# Patient Record
Sex: FEMALE | Race: WHITE | NOT HISPANIC OR LATINO
[De-identification: names, ages, dates, MRNs, and addresses within clinical notes are randomized per-mention and may not be internally consistent; named-entity substitution may affect disease eponyms.]

---

## 2017-01-09 ENCOUNTER — APPOINTMENT (OUTPATIENT)
Dept: HEMATOLOGY ONCOLOGY | Facility: CLINIC | Age: 67
End: 2017-01-09

## 2017-01-09 VITALS
SYSTOLIC BLOOD PRESSURE: 159 MMHG | WEIGHT: 150 LBS | DIASTOLIC BLOOD PRESSURE: 81 MMHG | BODY MASS INDEX: 24.11 KG/M2 | RESPIRATION RATE: 14 BRPM | TEMPERATURE: 96.8 F | HEIGHT: 66 IN

## 2017-01-09 DIAGNOSIS — N62 HYPERTROPHY OF BREAST: ICD-10-CM

## 2017-01-25 ENCOUNTER — RX RENEWAL (OUTPATIENT)
Age: 67
End: 2017-01-25

## 2017-04-19 ENCOUNTER — APPOINTMENT (OUTPATIENT)
Dept: CARDIOLOGY | Facility: CLINIC | Age: 67
End: 2017-04-19

## 2017-04-19 VITALS
WEIGHT: 150 LBS | SYSTOLIC BLOOD PRESSURE: 138 MMHG | HEIGHT: 66 IN | BODY MASS INDEX: 24.11 KG/M2 | DIASTOLIC BLOOD PRESSURE: 80 MMHG | HEART RATE: 59 BPM

## 2017-04-24 ENCOUNTER — APPOINTMENT (OUTPATIENT)
Dept: HEMATOLOGY ONCOLOGY | Facility: CLINIC | Age: 67
End: 2017-04-24

## 2017-04-24 ENCOUNTER — OUTPATIENT (OUTPATIENT)
Dept: OUTPATIENT SERVICES | Facility: HOSPITAL | Age: 67
LOS: 1 days | Discharge: HOME | End: 2017-04-24

## 2017-04-24 VITALS
HEIGHT: 66 IN | BODY MASS INDEX: 24.27 KG/M2 | RESPIRATION RATE: 14 BRPM | WEIGHT: 151 LBS | HEART RATE: 66 BPM | DIASTOLIC BLOOD PRESSURE: 72 MMHG | TEMPERATURE: 97.1 F | SYSTOLIC BLOOD PRESSURE: 177 MMHG

## 2017-04-24 RX ORDER — MULTIVITAMIN
TABLET ORAL DAILY
Refills: 0 | Status: ACTIVE | COMMUNITY

## 2017-05-15 ENCOUNTER — APPOINTMENT (OUTPATIENT)
Dept: BREAST CENTER | Facility: CLINIC | Age: 67
End: 2017-05-15

## 2017-05-15 VITALS
WEIGHT: 150 LBS | DIASTOLIC BLOOD PRESSURE: 92 MMHG | SYSTOLIC BLOOD PRESSURE: 158 MMHG | BODY MASS INDEX: 29.45 KG/M2 | HEIGHT: 60 IN

## 2017-06-28 DIAGNOSIS — D05.12 INTRADUCTAL CARCINOMA IN SITU OF LEFT BREAST: ICD-10-CM

## 2017-08-16 ENCOUNTER — OUTPATIENT (OUTPATIENT)
Dept: OUTPATIENT SERVICES | Facility: HOSPITAL | Age: 67
LOS: 1 days | Discharge: HOME | End: 2017-08-16

## 2017-08-16 DIAGNOSIS — R92.8 OTHER ABNORMAL AND INCONCLUSIVE FINDINGS ON DIAGNOSTIC IMAGING OF BREAST: ICD-10-CM

## 2017-08-23 ENCOUNTER — OUTPATIENT (OUTPATIENT)
Dept: OUTPATIENT SERVICES | Facility: HOSPITAL | Age: 67
LOS: 1 days | Discharge: HOME | End: 2017-08-23

## 2017-08-23 DIAGNOSIS — D05.11 INTRADUCTAL CARCINOMA IN SITU OF RIGHT BREAST: ICD-10-CM

## 2017-08-30 ENCOUNTER — APPOINTMENT (OUTPATIENT)
Dept: BREAST CENTER | Facility: CLINIC | Age: 67
End: 2017-08-30
Payer: COMMERCIAL

## 2017-08-30 VITALS
SYSTOLIC BLOOD PRESSURE: 136 MMHG | BODY MASS INDEX: 29.45 KG/M2 | DIASTOLIC BLOOD PRESSURE: 82 MMHG | HEIGHT: 60 IN | WEIGHT: 150 LBS

## 2017-08-30 PROCEDURE — 99213 OFFICE O/P EST LOW 20 MIN: CPT

## 2017-09-13 ENCOUNTER — APPOINTMENT (OUTPATIENT)
Dept: CARDIOLOGY | Facility: CLINIC | Age: 67
End: 2017-09-13

## 2017-09-13 VITALS
DIASTOLIC BLOOD PRESSURE: 80 MMHG | WEIGHT: 151 LBS | SYSTOLIC BLOOD PRESSURE: 122 MMHG | BODY MASS INDEX: 29.64 KG/M2 | HEIGHT: 60 IN | HEART RATE: 61 BPM

## 2017-09-13 RX ORDER — AZELAIC ACID 0.15 G/G
15 GEL TOPICAL TWICE DAILY
Refills: 0 | Status: ACTIVE | COMMUNITY

## 2017-11-20 ENCOUNTER — APPOINTMENT (OUTPATIENT)
Dept: HEMATOLOGY ONCOLOGY | Facility: CLINIC | Age: 67
End: 2017-11-20

## 2017-11-20 ENCOUNTER — OUTPATIENT (OUTPATIENT)
Dept: OUTPATIENT SERVICES | Facility: HOSPITAL | Age: 67
LOS: 1 days | Discharge: HOME | End: 2017-11-20

## 2017-11-20 VITALS
RESPIRATION RATE: 14 BRPM | HEART RATE: 70 BPM | DIASTOLIC BLOOD PRESSURE: 92 MMHG | HEIGHT: 60 IN | BODY MASS INDEX: 30.43 KG/M2 | TEMPERATURE: 96.7 F | SYSTOLIC BLOOD PRESSURE: 142 MMHG | WEIGHT: 155 LBS

## 2017-11-22 DIAGNOSIS — D05.12 INTRADUCTAL CARCINOMA IN SITU OF LEFT BREAST: ICD-10-CM

## 2018-01-11 ENCOUNTER — APPOINTMENT (OUTPATIENT)
Dept: CARDIOLOGY | Facility: CLINIC | Age: 68
End: 2018-01-11

## 2018-01-11 VITALS
BODY MASS INDEX: 30.23 KG/M2 | HEART RATE: 81 BPM | HEIGHT: 60 IN | DIASTOLIC BLOOD PRESSURE: 80 MMHG | WEIGHT: 154 LBS | SYSTOLIC BLOOD PRESSURE: 148 MMHG

## 2018-02-16 ENCOUNTER — OUTPATIENT (OUTPATIENT)
Dept: OUTPATIENT SERVICES | Facility: HOSPITAL | Age: 68
LOS: 1 days | Discharge: HOME | End: 2018-02-16

## 2018-02-16 ENCOUNTER — LABORATORY RESULT (OUTPATIENT)
Age: 68
End: 2018-02-16

## 2018-02-16 DIAGNOSIS — Z85.3 PERSONAL HISTORY OF MALIGNANT NEOPLASM OF BREAST: ICD-10-CM

## 2018-02-16 DIAGNOSIS — R92.8 OTHER ABNORMAL AND INCONCLUSIVE FINDINGS ON DIAGNOSTIC IMAGING OF BREAST: ICD-10-CM

## 2018-02-17 DIAGNOSIS — D05.11 INTRADUCTAL CARCINOMA IN SITU OF RIGHT BREAST: ICD-10-CM

## 2018-02-21 ENCOUNTER — OUTPATIENT (OUTPATIENT)
Dept: OUTPATIENT SERVICES | Facility: HOSPITAL | Age: 68
LOS: 1 days | Discharge: HOME | End: 2018-02-21

## 2018-02-21 DIAGNOSIS — D05.11 INTRADUCTAL CARCINOMA IN SITU OF RIGHT BREAST: ICD-10-CM

## 2018-03-07 ENCOUNTER — OUTPATIENT (OUTPATIENT)
Dept: OUTPATIENT SERVICES | Facility: HOSPITAL | Age: 68
LOS: 1 days | Discharge: HOME | End: 2018-03-07

## 2018-03-07 ENCOUNTER — RESULT REVIEW (OUTPATIENT)
Age: 68
End: 2018-03-07

## 2018-03-07 DIAGNOSIS — R92.8 OTHER ABNORMAL AND INCONCLUSIVE FINDINGS ON DIAGNOSTIC IMAGING OF BREAST: ICD-10-CM

## 2018-03-07 DIAGNOSIS — D05.11 INTRADUCTAL CARCINOMA IN SITU OF RIGHT BREAST: ICD-10-CM

## 2018-03-08 LAB — SURGICAL PATHOLOGY STUDY: SIGNIFICANT CHANGE UP

## 2018-03-20 ENCOUNTER — APPOINTMENT (OUTPATIENT)
Dept: BREAST CENTER | Facility: CLINIC | Age: 68
End: 2018-03-20
Payer: COMMERCIAL

## 2018-03-20 VITALS
HEIGHT: 66 IN | HEART RATE: 81 BPM | WEIGHT: 154 LBS | BODY MASS INDEX: 24.75 KG/M2 | SYSTOLIC BLOOD PRESSURE: 124 MMHG | DIASTOLIC BLOOD PRESSURE: 80 MMHG

## 2018-03-20 VITALS — OXYGEN SATURATION: 98 %

## 2018-03-20 PROCEDURE — 99213 OFFICE O/P EST LOW 20 MIN: CPT

## 2018-05-21 ENCOUNTER — APPOINTMENT (OUTPATIENT)
Dept: HEMATOLOGY ONCOLOGY | Facility: CLINIC | Age: 68
End: 2018-05-21

## 2018-05-21 ENCOUNTER — OUTPATIENT (OUTPATIENT)
Dept: OUTPATIENT SERVICES | Facility: HOSPITAL | Age: 68
LOS: 1 days | Discharge: HOME | End: 2018-05-21

## 2018-05-21 VITALS
HEIGHT: 66 IN | SYSTOLIC BLOOD PRESSURE: 185 MMHG | RESPIRATION RATE: 16 BRPM | BODY MASS INDEX: 25.23 KG/M2 | HEART RATE: 61 BPM | WEIGHT: 157 LBS | DIASTOLIC BLOOD PRESSURE: 85 MMHG | TEMPERATURE: 96.7 F

## 2018-05-23 DIAGNOSIS — D05.12 INTRADUCTAL CARCINOMA IN SITU OF LEFT BREAST: ICD-10-CM

## 2018-06-13 ENCOUNTER — APPOINTMENT (OUTPATIENT)
Dept: CARDIOLOGY | Facility: CLINIC | Age: 68
End: 2018-06-13

## 2018-06-13 VITALS
SYSTOLIC BLOOD PRESSURE: 124 MMHG | DIASTOLIC BLOOD PRESSURE: 78 MMHG | WEIGHT: 155 LBS | BODY MASS INDEX: 25.02 KG/M2 | HEART RATE: 63 BPM

## 2018-07-20 ENCOUNTER — APPOINTMENT (OUTPATIENT)
Dept: CARDIOLOGY | Facility: CLINIC | Age: 68
End: 2018-07-20

## 2018-07-31 ENCOUNTER — FORM ENCOUNTER (OUTPATIENT)
Age: 68
End: 2018-07-31

## 2018-08-01 ENCOUNTER — OUTPATIENT (OUTPATIENT)
Dept: OUTPATIENT SERVICES | Facility: HOSPITAL | Age: 68
LOS: 1 days | Discharge: HOME | End: 2018-08-01

## 2018-08-01 DIAGNOSIS — R92.8 OTHER ABNORMAL AND INCONCLUSIVE FINDINGS ON DIAGNOSTIC IMAGING OF BREAST: ICD-10-CM

## 2018-08-09 ENCOUNTER — APPOINTMENT (OUTPATIENT)
Dept: BREAST CENTER | Facility: CLINIC | Age: 68
End: 2018-08-09
Payer: COMMERCIAL

## 2018-08-09 VITALS
HEIGHT: 66 IN | HEART RATE: 73 BPM | OXYGEN SATURATION: 97 % | BODY MASS INDEX: 24.91 KG/M2 | DIASTOLIC BLOOD PRESSURE: 78 MMHG | WEIGHT: 155 LBS | SYSTOLIC BLOOD PRESSURE: 126 MMHG

## 2018-08-09 PROCEDURE — 99213 OFFICE O/P EST LOW 20 MIN: CPT

## 2018-09-24 ENCOUNTER — FORM ENCOUNTER (OUTPATIENT)
Age: 68
End: 2018-09-24

## 2018-09-25 ENCOUNTER — OUTPATIENT (OUTPATIENT)
Dept: OUTPATIENT SERVICES | Facility: HOSPITAL | Age: 68
LOS: 1 days | Discharge: HOME | End: 2018-09-25

## 2018-09-25 DIAGNOSIS — R92.8 OTHER ABNORMAL AND INCONCLUSIVE FINDINGS ON DIAGNOSTIC IMAGING OF BREAST: ICD-10-CM

## 2018-11-12 ENCOUNTER — OUTPATIENT (OUTPATIENT)
Dept: OUTPATIENT SERVICES | Facility: HOSPITAL | Age: 68
LOS: 1 days | Discharge: HOME | End: 2018-11-12

## 2018-11-12 ENCOUNTER — APPOINTMENT (OUTPATIENT)
Dept: HEMATOLOGY ONCOLOGY | Facility: CLINIC | Age: 68
End: 2018-11-12

## 2018-11-12 VITALS
SYSTOLIC BLOOD PRESSURE: 136 MMHG | HEART RATE: 73 BPM | DIASTOLIC BLOOD PRESSURE: 77 MMHG | BODY MASS INDEX: 24.91 KG/M2 | TEMPERATURE: 97.3 F | WEIGHT: 155 LBS | HEIGHT: 66 IN

## 2018-11-12 NOTE — REVIEW OF SYSTEMS
[Night Sweats] : night sweats [Fatigue] : fatigue [Hot Flashes] : hot flashes [Negative] : Allergic/Immunologic

## 2018-11-19 NOTE — ASSESSMENT
[FreeTextEntry1] : DCIS of the right breast.\par H/o ADH on the right breast s/p lumpectomy in 2015.\par Osteopenia \par \par RECOMMENDATION:\par She will continue with Arimidex. It was renewed. She was advised to continue Ca and Vit D.\par Exercise regularly.\par Next mammogram is due in Feb 2019, \par DEXA in 1/19\par mammogram and  MRI results discussed. Pt reassured about RT breast calcifications.\par  All of her questions and concerns were addressed.\par \par

## 2018-11-19 NOTE — RESULTS/DATA
[FreeTextEntry1] : Right Uni Dx Mammo - 08/01/18:\par BREAST COMPOSITION: There are scattered areas of fibroglandular density. \par FINDINGS: \par Architectural distortion in the right breast is consistent with prior \par surgery. Stable benign type calcifications are noted in the right breast. \par There is a group of punctate calcifications in the right upper outer \par quadrant which demonstrate the suggestion of developing fat necrosis. These \par are probably benign and continued follow-up is recommended. \par Benign fluctuating masses are seen in the right breast. No suspicious masses \par are identified. \par IMPRESSION: \par 1. Probably benign calcifications in the right upper outer quadrant. \par Mammographic follow-up is recommended in 6 months to demonstrate stability. \par 2. The patient will also be due for her bilateral mammogram in 6 months. \par Recommendation: Follow-up bilateral diagnostic mammogram in 6 months. \par BI-RADS category 3: Probably Benign

## 2018-11-19 NOTE — PHYSICAL EXAM
[Fully active, able to carry on all pre-disease performance without restriction] : Status 0 - Fully active, able to carry on all pre-disease performance without restriction [Normal] : grossly intact [de-identified] : Post surgical changes on the right breast showing well healed surgical scar. No palpable lumps.

## 2018-11-19 NOTE — HISTORY OF PRESENT ILLNESS
[Disease: _____________________] : Disease: [unfilled] [de-identified] : She is a 68 year old female patient who was previously diagnosed with right breast carcinoma in situ. \par \par She underwent right breast lumpectomy with Dr Morales on 10/25/2016. 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 Margins were negative with additional features of ADH and fibroadenoma seen. ER was 100 % and [place] was reported to be 80 %. Oncotype for DCIS showed a recurrence score of 7, suggesting 10 % risk of DCIS or invasive cancer. She has completed radiation treatment with Accelerated Partial Breast Radiation.\par \par \par  [de-identified] : She has been taking Arimidex for the past 2 years and has been tolerating it well. She is currently taking Calcium 600 mg with Vitamin D.  She needs to follow up with GYN. Up to date with colonoscopy ( to return this year as she had a polyp.).\par \par She is c/o pain in her joints. C/O myalgia.\par She was advised simvastatin which she took for 2 months and then dced due to myalgia.

## 2018-11-28 DIAGNOSIS — Z79.811 LONG TERM (CURRENT) USE OF AROMATASE INHIBITORS: ICD-10-CM

## 2018-11-28 DIAGNOSIS — D05.12 INTRADUCTAL CARCINOMA IN SITU OF LEFT BREAST: ICD-10-CM

## 2018-12-06 ENCOUNTER — APPOINTMENT (OUTPATIENT)
Dept: CARDIOLOGY | Facility: CLINIC | Age: 68
End: 2018-12-06

## 2018-12-06 VITALS
HEIGHT: 66 IN | SYSTOLIC BLOOD PRESSURE: 144 MMHG | DIASTOLIC BLOOD PRESSURE: 82 MMHG | HEART RATE: 78 BPM | BODY MASS INDEX: 24.11 KG/M2 | WEIGHT: 150 LBS

## 2018-12-06 NOTE — HISTORY OF PRESENT ILLNESS
[FreeTextEntry1] : Justina is a 65-year-old white female, under my care for the past 4 years. \par \par The patient presented to this office with complaints of chest discomfort and  cardiovascular screening.\par \par Her workup was essentially negative in terms of ischemic heart disease. She underwent a stress test which was normal and an echo which was also done at the same time which basically was normal.\par \par The patient does have mild hypertension for which he takes Accupril and hydrochlorothiazide.\par \par The patient does complain of chest pain. Atypical for heart and coronary disease. No symptoms of congestive heart failure. No new cardiac complaints.\par \par The patient is here for routine followup care.\par sill c/om chest pain- atypical\par  NO SOB\par uneventful 3 mos\par ros is negative\par \par stress test was negatiive\par echo is normal

## 2018-12-06 NOTE — REASON FOR VISIT
[Follow-Up - Clinic] : a clinic follow-up of [Chest Pain] : chest pain [Dyspnea] : dyspnea [Hypertension] : hypertension [FreeTextEntry2] : hypertension

## 2018-12-06 NOTE — PHYSICAL EXAM
[General Appearance - Well Developed] : well developed [Normal Appearance] : normal appearance [Well Groomed] : well groomed [General Appearance - Well Nourished] : well nourished [No Deformities] : no deformities [General Appearance - In No Acute Distress] : no acute distress [Normal Conjunctiva] : the conjunctiva exhibited no abnormalities [Eyelids - No Xanthelasma] : the eyelids demonstrated no xanthelasmas [Normal Oral Mucosa] : normal oral mucosa [No Oral Pallor] : no oral pallor [No Oral Cyanosis] : no oral cyanosis [Normal Jugular Venous A Waves Present] : normal jugular venous A waves present [Normal Jugular Venous V Waves Present] : normal jugular venous V waves present [No Jugular Venous Brown A Waves] : no jugular venous brown A waves [Respiration, Rhythm And Depth] : normal respiratory rhythm and effort [Exaggerated Use Of Accessory Muscles For Inspiration] : no accessory muscle use [Auscultation Breath Sounds / Voice Sounds] : lungs were clear to auscultation bilaterally [Heart Rate And Rhythm] : heart rate and rhythm were normal [Heart Sounds] : normal S1 and S2 [Murmurs] : no murmurs present [Abdomen Soft] : soft [Abdomen Tenderness] : non-tender [Abdomen Mass (___ Cm)] : no abdominal mass palpated [Abnormal Walk] : normal gait [Gait - Sufficient For Exercise Testing] : the gait was sufficient for exercise testing [Nail Clubbing] : no clubbing of the fingernails [Cyanosis, Localized] : no localized cyanosis [Petechial Hemorrhages (___cm)] : no petechial hemorrhages [Skin Color & Pigmentation] : normal skin color and pigmentation [] : no rash [No Venous Stasis] : no venous stasis [Skin Lesions] : no skin lesions [No Skin Ulcers] : no skin ulcer [No Xanthoma] : no  xanthoma was observed [Oriented To Time, Place, And Person] : oriented to person, place, and time [Affect] : the affect was normal [Mood] : the mood was normal [No Anxiety] : not feeling anxious

## 2019-02-03 ENCOUNTER — FORM ENCOUNTER (OUTPATIENT)
Age: 69
End: 2019-02-03

## 2019-02-04 ENCOUNTER — OUTPATIENT (OUTPATIENT)
Dept: OUTPATIENT SERVICES | Facility: HOSPITAL | Age: 69
LOS: 1 days | Discharge: HOME | End: 2019-02-04

## 2019-02-04 DIAGNOSIS — R92.8 OTHER ABNORMAL AND INCONCLUSIVE FINDINGS ON DIAGNOSTIC IMAGING OF BREAST: ICD-10-CM

## 2019-02-19 ENCOUNTER — APPOINTMENT (OUTPATIENT)
Dept: BREAST CENTER | Facility: CLINIC | Age: 69
End: 2019-02-19
Payer: COMMERCIAL

## 2019-03-12 ENCOUNTER — APPOINTMENT (OUTPATIENT)
Dept: BREAST CENTER | Facility: CLINIC | Age: 69
End: 2019-03-12
Payer: COMMERCIAL

## 2019-03-12 VITALS
HEIGHT: 66 IN | DIASTOLIC BLOOD PRESSURE: 78 MMHG | TEMPERATURE: 98.1 F | SYSTOLIC BLOOD PRESSURE: 122 MMHG | WEIGHT: 155 LBS | BODY MASS INDEX: 24.91 KG/M2

## 2019-03-12 PROCEDURE — 99212 OFFICE O/P EST SF 10 MIN: CPT

## 2019-03-12 NOTE — DATA REVIEWED
[FreeTextEntry1] : EXAM:  MG MAMMO DIAG W SYLWIA BI#         PROCEDURE DATE:  02/04/2019      INTERPRETATION:  Clinical indication/reason for exam: Follow-up for right  breast calcifications since 8/1/2018.   The patient reports her last clinical breast examination was performed  within the past month.  This is a follow-up examination to reassess calcifications considered  probably benign on the previous mammogram. Magnification images as well  as routine mammographic projections were obtained.  Computer-aided detection was utilized in the interpretation of this  examination.    Comparison is made to the prior mammograms dated back 9/23/2016..  Breast composition: There are scattered areas of fibroglandular density.  Architectural distortion in the right breast is consistent with prior  surgery. Stable benign type calcifications are noted in the right breast.  There is a group of punctate calcifications in the upper outer quadrant  of the right breast which which are probably benign representing fat  necrosis and are without significant change. No suspicious masses are  identified.  Impression: The stability of the calcifications in the right breast  supports a benign etiology.   Recommendation: Follow-up unilateral diagnostic mammogram in 6 months.  BI-RADS category 3: Probably Benign      KATIE VALADEZ M.D., RESIDENT RADIOLOGIST This document has been electronically signed. ESTER BUENO M.D., ATTENDING RADIOLOGIST This document has been electronically signed. Feb 4 2019  3:23PM

## 2019-03-12 NOTE — ASSESSMENT
[FreeTextEntry1] : Justnia has a h/o left breast lumpectomy for DCIS in 2016.  She has a benign CBE. There are no palpable findings, axillary lymphadenopathy, nipple discharge or inversion. I reviewed her recent imaging.  She has probably benign calcifications likely due to fat necrosis which have been stable thus far.\par \par She will need a right dx mammo in August 2019. SHe will also need a bilateral breast MRI in September 2019 and f/up after testing.\par \par I spent a total of 10 minutes of face to face time with this patient, greater than 50% of which was spent in counseling and/or coordination of care.  All of her questions were appropriately answered.\par

## 2019-03-12 NOTE — PAST MEDICAL HISTORY
[History of Hormone Replacement Treatment] : has no history of hormone replacement treatment [FreeTextEntry6] : No. [FreeTextEntry7] : Yes.

## 2019-03-12 NOTE — HISTORY OF PRESENT ILLNESS
[FreeTextEntry1] : Problem List:\par 1. 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 (dx'd on MRI - 09/2016)\par 2. s/p Right NG Lumpectomy - 10/25/16.\par 3. h/o Right ADH/ALH (18% Katty score)\par 4. s/p PBI external beam - completed 12/2016.\par 5. Dr. Amber Cheney\par 6. s/p Right breast MRI guided bx - benign concordant - March 2018.\par \par GIA PLASCENCIA is a 68 year old female patient who presents today in follow up for history of right breast DCIS.\par Since her last visit, she has no new breast related complaints. Imaging of the right breast was done on 08/01/18, which revealed probably benign calcifications in the right upper outer quadrant (BIRADS 3).\par \par She presents today for evaluation and imaging review.

## 2019-05-13 ENCOUNTER — OUTPATIENT (OUTPATIENT)
Dept: OUTPATIENT SERVICES | Facility: HOSPITAL | Age: 69
LOS: 1 days | Discharge: HOME | End: 2019-05-13

## 2019-05-13 ENCOUNTER — APPOINTMENT (OUTPATIENT)
Dept: HEMATOLOGY ONCOLOGY | Facility: CLINIC | Age: 69
End: 2019-05-13

## 2019-05-13 VITALS
HEIGHT: 63.5 IN | DIASTOLIC BLOOD PRESSURE: 84 MMHG | TEMPERATURE: 97 F | BODY MASS INDEX: 27.65 KG/M2 | RESPIRATION RATE: 16 BRPM | HEART RATE: 67 BPM | WEIGHT: 158 LBS | SYSTOLIC BLOOD PRESSURE: 149 MMHG

## 2019-05-15 NOTE — PHYSICAL EXAM
[Fully active, able to carry on all pre-disease performance without restriction] : Status 0 - Fully active, able to carry on all pre-disease performance without restriction [Normal] : grossly intact [de-identified] : Post surgical changes on the right breast showing well healed surgical scar. No palpable lumps.

## 2019-05-15 NOTE — ASSESSMENT
[FreeTextEntry1] : DCIS of the right breast.\par H/o ADH on the right breast s/p lumpectomy in 2015.\par Osteopenia \par \par RECOMMENDATION:\par She will continue with Arimidex. It was renewed. She was advised to continue Ca and Vit D. On Anastrazole since Jan 2017. \par Exercise regularly.\par Last mammo in Feb 2019. Next one due in August 2019. \par DEXA in 02/2019 showed osteopenia. \par CBC, CMP normal in 02/2019.\par \par  All of her questions and concerns were addressed.\par \par RTC in 6 months.\par Pt seen and examined with . \par

## 2019-05-15 NOTE — RESULTS/DATA
[FreeTextEntry1] :  EXAM:  MG MAMMO DIAG W SYLWIA BI#      \par \par \par PROCEDURE DATE:  02/04/2019  \par \par \par \par INTERPRETATION:  Clinical indication/reason for exam: Follow-up for right \par breast calcifications since 8/1/2018. \par \par The patient reports her last clinical breast examination was performed \par within the past month.\par \par This is a follow-up examination to reassess calcifications considered \par probably benign on the previous mammogram. Magnification images as well \par as routine mammographic projections were obtained.\par \par Computer-aided detection was utilized in the interpretation of this \par examination.  \par \par Comparison is made to the prior mammograms dated back 9/23/2016..\par \par Breast composition: There are scattered areas of fibroglandular density.\par \par Architectural distortion in the right breast is consistent with prior \par surgery. Stable benign type calcifications are noted in the right breast. \par There is a group of punctate calcifications in the upper outer quadrant \par of the right breast which which are probably benign representing fat \par necrosis and are without significant change. No suspicious masses are \par identified.\par \par Impression: The stability of the calcifications in the right breast \par supports a benign etiology. \par \par Recommendation: Follow-up unilateral diagnostic mammogram in 6 months.\par \par BI-RADS category 3: Probably Benign\par \par

## 2019-05-15 NOTE — HISTORY OF PRESENT ILLNESS
[Disease: _____________________] : Disease: [unfilled] [de-identified] : She is a 68 year old female patient who was previously diagnosed with right breast carcinoma in situ. \par \par She underwent right breast lumpectomy with Dr Morales on 10/25/2016. 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 Margins were negative with additional features of ADH and fibroadenoma seen. ER was 100 % and [place] was reported to be 80 %. Oncotype for DCIS showed a recurrence score of 7, suggesting 10 % risk of DCIS or invasive cancer. She has completed radiation treatment with Accelerated Partial Breast Radiation.\par \par \par  [de-identified] : She has been taking Arimidex for the past 2 years and has been tolerating it well. She is currently taking Calcium 600 mg with Vitamin D.  She needs to follow up with GYN. Up to date with colonoscopy ( to return this year as she had a polyp.).\par \par She is c/o pain in her joints. C/O myalgia.\par She was advised simvastatin which she took for 2 months and then dced due to myalgia.\par \par 5/13/19:\par Back on Zocor 10mg.\par Colonoscopy in Feb 2019 - No polyps. \par On Anastrazole since Jan 2017. \par B/l mammo in 02/2019. Next one in 6 months.\par DEXA in 02/2019 showed osteopenia (T -1.6)\par CBC, CMP normal in 02/2019.

## 2019-05-29 DIAGNOSIS — D05.12 INTRADUCTAL CARCINOMA IN SITU OF LEFT BREAST: ICD-10-CM

## 2019-05-29 DIAGNOSIS — Z79.811 LONG TERM (CURRENT) USE OF AROMATASE INHIBITORS: ICD-10-CM

## 2019-06-14 ENCOUNTER — APPOINTMENT (OUTPATIENT)
Dept: CARDIOLOGY | Facility: CLINIC | Age: 69
End: 2019-06-14
Payer: COMMERCIAL

## 2019-06-14 VITALS
SYSTOLIC BLOOD PRESSURE: 122 MMHG | DIASTOLIC BLOOD PRESSURE: 80 MMHG | HEART RATE: 58 BPM | BODY MASS INDEX: 26.25 KG/M2 | HEIGHT: 63.5 IN | WEIGHT: 150 LBS

## 2019-06-14 VITALS
WEIGHT: 150 LBS | HEART RATE: 58 BPM | DIASTOLIC BLOOD PRESSURE: 80 MMHG | SYSTOLIC BLOOD PRESSURE: 122 MMHG | HEIGHT: 63.5 IN | BODY MASS INDEX: 26.25 KG/M2

## 2019-06-14 PROCEDURE — 99214 OFFICE O/P EST MOD 30 MIN: CPT

## 2019-06-14 PROCEDURE — 93000 ELECTROCARDIOGRAM COMPLETE: CPT

## 2019-06-14 RX ORDER — SIMVASTATIN 10 MG/1
10 TABLET, FILM COATED ORAL EVERY OTHER DAY
Refills: 0 | Status: ACTIVE | COMMUNITY

## 2019-06-14 RX ORDER — HYDROCHLOROTHIAZIDE 50 MG/1
50 TABLET ORAL DAILY
Refills: 0 | Status: ACTIVE | COMMUNITY

## 2019-08-04 ENCOUNTER — FORM ENCOUNTER (OUTPATIENT)
Age: 69
End: 2019-08-04

## 2019-08-05 ENCOUNTER — OUTPATIENT (OUTPATIENT)
Dept: OUTPATIENT SERVICES | Facility: HOSPITAL | Age: 69
LOS: 1 days | Discharge: HOME | End: 2019-08-05
Payer: COMMERCIAL

## 2019-08-05 DIAGNOSIS — R92.8 OTHER ABNORMAL AND INCONCLUSIVE FINDINGS ON DIAGNOSTIC IMAGING OF BREAST: ICD-10-CM

## 2019-08-05 PROCEDURE — G0279: CPT | Mod: 26,RT

## 2019-08-05 PROCEDURE — 77065 DX MAMMO INCL CAD UNI: CPT | Mod: 26,RT

## 2019-08-14 ENCOUNTER — RECORD ABSTRACTING (OUTPATIENT)
Age: 69
End: 2019-08-14

## 2019-08-14 DIAGNOSIS — Z80.0 FAMILY HISTORY OF MALIGNANT NEOPLASM OF DIGESTIVE ORGANS: ICD-10-CM

## 2019-08-14 DIAGNOSIS — Z84.89 FAMILY HISTORY OF OTHER SPECIFIED CONDITIONS: ICD-10-CM

## 2019-08-14 DIAGNOSIS — Z78.9 OTHER SPECIFIED HEALTH STATUS: ICD-10-CM

## 2019-08-14 DIAGNOSIS — Z80.8 FAMILY HISTORY OF MALIGNANT NEOPLASM OF OTHER ORGANS OR SYSTEMS: ICD-10-CM

## 2019-09-12 ENCOUNTER — FORM ENCOUNTER (OUTPATIENT)
Age: 69
End: 2019-09-12

## 2019-09-13 ENCOUNTER — OUTPATIENT (OUTPATIENT)
Dept: OUTPATIENT SERVICES | Facility: HOSPITAL | Age: 69
LOS: 1 days | Discharge: HOME | End: 2019-09-13
Payer: COMMERCIAL

## 2019-09-13 DIAGNOSIS — D05.11 INTRADUCTAL CARCINOMA IN SITU OF RIGHT BREAST: ICD-10-CM

## 2019-09-13 PROCEDURE — 77049 MRI BREAST C-+ W/CAD BI: CPT | Mod: 26

## 2019-09-24 ENCOUNTER — APPOINTMENT (OUTPATIENT)
Dept: BREAST CENTER | Facility: CLINIC | Age: 69
End: 2019-09-24
Payer: COMMERCIAL

## 2019-09-24 VITALS
BODY MASS INDEX: 26.25 KG/M2 | HEIGHT: 63.5 IN | DIASTOLIC BLOOD PRESSURE: 80 MMHG | WEIGHT: 150 LBS | TEMPERATURE: 98 F | SYSTOLIC BLOOD PRESSURE: 136 MMHG

## 2019-09-24 PROCEDURE — 99213 OFFICE O/P EST LOW 20 MIN: CPT

## 2019-09-25 ENCOUNTER — APPOINTMENT (OUTPATIENT)
Dept: RADIATION ONCOLOGY | Facility: CLINIC | Age: 69
End: 2019-09-25

## 2019-09-25 NOTE — PHYSICAL EXAM
[Normocephalic] : normocephalic [Atraumatic] : atraumatic [No dominant masses] : no dominant masses in right breast  [No dominant masses] : no dominant masses left breast [No Nipple Discharge] : no left nipple discharge [No Rashes] : no rashes [No Ulceration] : no ulceration [Breast Nipple Inversion] : nipples not inverted [Breast Nipple Retraction] : nipples not retracted [de-identified] : bilateral scattered areas of fibroglandular densities.  [de-identified] : well healed surgical scar. [de-identified] : No axillary lymphadenopathy appreciated. [de-identified] : No axillary lymphadenopathy appreciated. sebaceous cyst noted.

## 2019-09-25 NOTE — PAST MEDICAL HISTORY
[Postmenopausal] : The patient is postmenopausal [Menarche Age ____] : age at menarche was [unfilled] [Menopause Age____] : age at menopause was [unfilled] [Total Preg ___] : G[unfilled] [History of Hormone Replacement Treatment] : has no history of hormone replacement treatment [FreeTextEntry7] : Yes. [FreeTextEntry6] : No.

## 2019-09-25 NOTE — DATA REVIEWED
[FreeTextEntry1] : Right Uni Dx Mammo - 08/05/19:\par BREAST COMPOSITION: There are scattered areas of fibroglandular density. \par VIEWS: 2-D and tomosynthesis CC/MLO views of the right breast, spot magnification CC/MLO views of the right breast. \par Computer-aided detection was utilized by the radiologists in the interpretation of this examination. \par FINDINGS: \par Grouped heterogeneous calcifications in the upper outer quadrant of the right breast have increased in coarseness but are unchanged in extent, indicating they are likely due to fat necrosis and are probably benign. Continued mammographic follow-up is recommended to demonstrate stability. \par Stable benign circumscribed mass is seen in the right central breast. Architectural distortion in the right breast is consistent with prior surgery. There has been no significant interval change from the prior studies. \par IMPRESSION: \par Probably benign calcifications in the right breast as described above. \par Recommendation: Follow-up bilateral diagnostic mammogram in 6 months. \par BI-RADS category 3: Probably Benign\par \par \par B/L Breast MRI - 09/13/19:\par Findings: \par Amount of fibroglandular tissue: Scattered fibroglandular tissue \par Background parenchymal enhancement: Mild, Symmetric\par RIGHT BREAST: \par Stable postsurgical and postradiation changes of the right breast. Stable postsurgical changes in the anterior right breast consistent with prior excisional biopsy. Stable posterior right breast cyst. No suspicious enhancement is identified in the right breast. \par The nipple and skin appear normal. \par There is no axillary adenopathy. \par LEFT BREAST: \par Stable sebaceous cyst in the left axillary tail. No enhancing mass, architectural distortion, or suspicious area of enhancement is identified.\par The nipple and skin appear normal. \par There is no axillary adenopathy. \par The imaged portions of the chest and abdomen are unremarkable. \par Impression: \par Stable postsurgical changes of the right breast. No MR evidence of malignancy in either breast. \par Recommendation: Follow-up bilateral diagnostic mammogram in 5 months. \par BI-RADS Category 2: Benign

## 2019-09-25 NOTE — HISTORY OF PRESENT ILLNESS
[FreeTextEntry1] : Problem List:\par 1. Right breast DCIS. ER/PA (+). (dx'd on MRI - 09/2016)\par 2. s/p Right NG Lumpectomy - 10/25/16.\par 3. h/o Right ADH/ALH \par 4. s/p PBI external beam - completed 12/2016.\par 5. Dr. Amber Cheney\par 6. s/p Right breast MRI guided bx - benign concordant - March 2018.\par \par GIA PLASCENCIA is a 69 year old female patient who presents today in follow up for history of \par right breast DCIS.\par Since her last visit, she has no new breast related complaints. \par Imaging of the right breast was done on 08/05/19, which again revealed probably benign calcifications in the right breast (BIRADS 3).\par Imaging with bilateral breast MRI was also done on 09/13/19, which revealed stable postsurgical changes of the right breast. No MR evidence of malignancy in either breast.\par \par She presents today for evaluation and imaging review.

## 2019-11-04 ENCOUNTER — APPOINTMENT (OUTPATIENT)
Dept: HEMATOLOGY ONCOLOGY | Facility: CLINIC | Age: 69
End: 2019-11-04
Payer: COMMERCIAL

## 2019-11-04 ENCOUNTER — OUTPATIENT (OUTPATIENT)
Dept: OUTPATIENT SERVICES | Facility: HOSPITAL | Age: 69
LOS: 1 days | Discharge: HOME | End: 2019-11-04

## 2019-11-04 VITALS
DIASTOLIC BLOOD PRESSURE: 55 MMHG | SYSTOLIC BLOOD PRESSURE: 119 MMHG | HEART RATE: 61 BPM | TEMPERATURE: 96.5 F | BODY MASS INDEX: 25.1 KG/M2 | WEIGHT: 147 LBS | HEIGHT: 64 IN

## 2019-11-04 PROCEDURE — 99214 OFFICE O/P EST MOD 30 MIN: CPT

## 2019-11-04 NOTE — PHYSICAL EXAM
[Fully active, able to carry on all pre-disease performance without restriction] : Status 0 - Fully active, able to carry on all pre-disease performance without restriction [Normal] : grossly intact [de-identified] : Post surgical changes on the right breast showing well healed surgical scar. No palpable lumps.

## 2019-11-04 NOTE — ASSESSMENT
[FreeTextEntry1] : DCIS of the right breast.\par H/o ADH on the right breast s/p lumpectomy in 2015.\par Osteopenia \par \par RECOMMENDATION:\par She will continue with Arimidex. It was renewed. She was advised to continue Ca and Vit D. On Anastrazole since Jan 2017. \par The side effects from such treatment were discussed in detail including but not limited to hot flashes, weight gain, hair thinning, arthralgia, myalgia, bone loss, increased risk of osteoporotic fractures and thrombo-embolism.\par Her compliance and any side effects from such treatment were assessed at today's visit\par \par Exercise regularly.\par Next mammo B/L due in 2/20\par results of MRI breast and mammogram reviewed \par GYN and GI follow up\par DEXA in 02/2019 showed osteopenia. \par CBC, CMP normal in 02/2019.\par \par  All of her questions and concerns were addressed.\par \par RTC in 6 months.\par

## 2019-11-04 NOTE — HISTORY OF PRESENT ILLNESS
[Disease: _____________________] : Disease: [unfilled] [de-identified] : She is a 68 year old female patient who was previously diagnosed with right breast carcinoma in situ. \par \par She underwent right breast lumpectomy with Dr Morales on 10/25/2016. 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 Margins were negative with additional features of ADH and fibroadenoma seen. ER was 100 % and [place] was reported to be 80 %. Oncotype for DCIS showed a recurrence score of 7, suggesting 10 % risk of DCIS or invasive cancer. She has completed radiation treatment with Accelerated Partial Breast Radiation.\par \par \par  [de-identified] : She has been taking Arimidex for the past 2 years and has been tolerating it well. She is currently taking Calcium 600 mg with Vitamin D.  She needs to follow up with GYN. Up to date with colonoscopy ( to return this year as she had a polyp.).\par \par She is c/o pain in her joints. C/O myalgia.\par She was advised simvastatin which she took for 2 months and then dced due to myalgia.\par \par 5/13/19:\par Back on Zocor 10mg.\par Colonoscopy in Feb 2019 - No polyps. \par On Anastrazole since Jan 2017. \par B/l mammo in 02/2019. Next one in 6 months.\par DEXA in 02/2019 showed osteopenia (T -1.6)\par CBC, CMP normal in 02/2019.\par \par 11/4/19\par  Patient here for follow up, feeling well.  She denies any new complaints.  Patient denies any new palpable breast lumps or pain, denies skin changes, denies nipple discharge.  Patient denies cough, shortness of breath, denies fever, denies bone pain.\par diagnosed with GERD and hiatal hernia\par

## 2019-11-04 NOTE — RESULTS/DATA
[FreeTextEntry1] : EXAM: MG MAMMO DIAG W SYLWIA RT# \par PROCEDURE DATE: 08/05/2019 \par INTERPRETATION: CLINICAL HISTORY: Short interval follow-up for probably benign calcifications in the right breast since \par 8/1/2018. \par COMPARISONS: Mammograms dating back to 2017. \par The patient reports her last clinical breast examination was performed 5 months ago. \par FAMILY HISTORY: None. \par BREAST COMPOSITION: There are scattered areas of fibroglandular density. \par VIEWS: 2-D and tomosynthesis CC/MLO views of the right breast, spot magnification CC/MLO views of the right breast. \par Computer-aided detection was utilized by the radiologists in the interpretation of this examination. \par FINDINGS: \par Grouped heterogeneous calcifications in the upper outer quadrant of the right breast have increased in coarseness but are \par unchanged in extent, indicating they are likely due to fat necrosis and are probably benign. Continued mammographic \par follow-up is recommended to demonstrate stability. \par Stable benign circumscribed mass is seen in the right central breast. Architectural distortion in the right breast is \par consistent with prior surgery. There has been no significant interval change from the prior studies. \par IMPRESSION: \par Probably benign calcifications in the right breast as described above. \par Recommendation: Follow-up bilateral diagnostic mammogram in 6 months. \par BI-RADS category 3: Probably Benign \par The findings and recommendations were discussed with the patient.\par \par EXAM: MR BREAST WAW IC BI \par PROCEDURE DATE: 09/13/2019 \par INTERPRETATION: Clinical History / Reason for exam: Personal history of right breast cancer status post breast \par conserving therapy in 2016. \par Technique: Breast MRI is performed at 1.5 T with the patient prone and the breasts in a dedicated breast coil. \par Following a 3 plane localizer, sagittal T1 weighted, fat-saturated T1 weighted and fat saturated T2-weighted \par sequence; dynamic contrast enhanced sagittal images; and delayed post-contrast axial fat-saturated T1 \par weighted images were obtained. 7 mL gadolinium contrast was injected and 0.5 mL was discarded. Subtraction \par and MIP images were reviewed. [employer] software was used in interpretation. \par Comparison: Prior mammograms and ultrasounds dated back to 2016. \par Findings: \par Amount of fibroglandular tissue: Scattered fibroglandular tissue \par Background parenchymal enhancement: Mild, Symmetric\par RIGHT BREAST: \par Stable postsurgical and postradiation changes of the right breast. Stable postsurgical changes in the anterior \par right breast consistent with prior excisional biopsy. Stable posterior right breast cyst. No suspicious \par enhancement is identified in the right breast. \par The nipple and skin appear normal. \par There is no axillary adenopathy. \par LEFT BREAST: \par Stable sebaceous cyst in the left axillary tail. No enhancing mass, architectural distortion, or suspicious area of \par enhancement is identified.\par The nipple and skin appear normal. \par There is no axillary adenopathy. \par The imaged portions of the chest and abdomen are unremarkable. \par Impression: \par Stable postsurgical changes of the right breast. No MR evidence of malignancy in either breast. \par Recommendation: Follow-up bilateral diagnostic mammogram in 5 months. \par BI-RADS Category 2: Benign

## 2019-11-05 DIAGNOSIS — Z79.811 LONG TERM (CURRENT) USE OF AROMATASE INHIBITORS: ICD-10-CM

## 2019-11-05 DIAGNOSIS — D05.11 INTRADUCTAL CARCINOMA IN SITU OF RIGHT BREAST: ICD-10-CM

## 2019-12-05 ENCOUNTER — APPOINTMENT (OUTPATIENT)
Dept: CARDIOLOGY | Facility: CLINIC | Age: 69
End: 2019-12-05
Payer: COMMERCIAL

## 2019-12-05 VITALS
SYSTOLIC BLOOD PRESSURE: 148 MMHG | HEIGHT: 64 IN | DIASTOLIC BLOOD PRESSURE: 82 MMHG | WEIGHT: 148 LBS | HEART RATE: 78 BPM | BODY MASS INDEX: 25.27 KG/M2

## 2019-12-05 PROCEDURE — 99214 OFFICE O/P EST MOD 30 MIN: CPT

## 2019-12-05 PROCEDURE — 93000 ELECTROCARDIOGRAM COMPLETE: CPT

## 2019-12-05 NOTE — REASON FOR VISIT
[Follow-Up - Clinic] : a clinic follow-up of [Chest Pain] : chest pain [Dyspnea] : dyspnea [Hypertension] : hypertension

## 2019-12-05 NOTE — PHYSICAL EXAM
[Well Groomed] : well groomed [General Appearance - Well Developed] : well developed [Normal Appearance] : normal appearance [General Appearance - In No Acute Distress] : no acute distress [No Deformities] : no deformities [General Appearance - Well Nourished] : well nourished [Normal Conjunctiva] : the conjunctiva exhibited no abnormalities [Eyelids - No Xanthelasma] : the eyelids demonstrated no xanthelasmas [Normal Oral Mucosa] : normal oral mucosa [No Oral Pallor] : no oral pallor [No Oral Cyanosis] : no oral cyanosis [Normal Jugular Venous A Waves Present] : normal jugular venous A waves present [Normal Jugular Venous V Waves Present] : normal jugular venous V waves present [No Jugular Venous Brown A Waves] : no jugular venous brown A waves [Heart Rate And Rhythm] : heart rate and rhythm were normal [Heart Sounds] : normal S1 and S2 [Murmurs] : no murmurs present [Respiration, Rhythm And Depth] : normal respiratory rhythm and effort [Abdomen Soft] : soft [Exaggerated Use Of Accessory Muscles For Inspiration] : no accessory muscle use [Auscultation Breath Sounds / Voice Sounds] : lungs were clear to auscultation bilaterally [Abdomen Tenderness] : non-tender [Abdomen Mass (___ Cm)] : no abdominal mass palpated [Abnormal Walk] : normal gait [Nail Clubbing] : no clubbing of the fingernails [Gait - Sufficient For Exercise Testing] : the gait was sufficient for exercise testing [Cyanosis, Localized] : no localized cyanosis [Petechial Hemorrhages (___cm)] : no petechial hemorrhages [Skin Color & Pigmentation] : normal skin color and pigmentation [No Venous Stasis] : no venous stasis [] : no rash [Skin Lesions] : no skin lesions [No Skin Ulcers] : no skin ulcer [No Xanthoma] : no  xanthoma was observed [Oriented To Time, Place, And Person] : oriented to person, place, and time [Affect] : the affect was normal [Mood] : the mood was normal [No Anxiety] : not feeling anxious

## 2019-12-10 NOTE — HISTORY OF PRESENT ILLNESS
[FreeTextEntry1] : Justina is a 65-year-old white female, under my care for the past 4 years. \par \par The patient presented to this office with complaints of chest discomfort and  cardiovascular screening.\par \par Her workup was essentially negative in terms of ischemic heart disease.\par  She underwent a stress test which was normal and an echo which was also done at the same time which basically was normal.\par \par The patient does have mild hypertension for which he takes Accupril and hydrochlorothiazide.\par \par The patient does complain of chest pain. Atypical for heart and coronary disease. No symptoms of congestive heart failure. No new cardiac complaints.\par \par The patient is here for routine followup care.\par sill c/om chest pain- atypical\par  NO SOB\par uneventful 3 mos\par ros is negative\par \par stress test was negative\par echo is normal\par no tia, cva or pvd\par \par ros is essentially unchanged

## 2020-02-09 ENCOUNTER — FORM ENCOUNTER (OUTPATIENT)
Age: 70
End: 2020-02-09

## 2020-02-10 ENCOUNTER — OUTPATIENT (OUTPATIENT)
Dept: OUTPATIENT SERVICES | Facility: HOSPITAL | Age: 70
LOS: 1 days | Discharge: HOME | End: 2020-02-10
Payer: COMMERCIAL

## 2020-02-10 DIAGNOSIS — R92.8 OTHER ABNORMAL AND INCONCLUSIVE FINDINGS ON DIAGNOSTIC IMAGING OF BREAST: ICD-10-CM

## 2020-02-10 PROCEDURE — G0279: CPT | Mod: 26

## 2020-02-10 PROCEDURE — 77066 DX MAMMO INCL CAD BI: CPT | Mod: 26

## 2020-02-25 ENCOUNTER — APPOINTMENT (OUTPATIENT)
Dept: BREAST CENTER | Facility: CLINIC | Age: 70
End: 2020-02-25

## 2020-02-28 ENCOUNTER — APPOINTMENT (OUTPATIENT)
Dept: RADIATION ONCOLOGY | Facility: HOSPITAL | Age: 70
End: 2020-02-28

## 2020-05-04 ENCOUNTER — APPOINTMENT (OUTPATIENT)
Dept: HEMATOLOGY ONCOLOGY | Facility: CLINIC | Age: 70
End: 2020-05-04

## 2020-06-01 ENCOUNTER — APPOINTMENT (OUTPATIENT)
Dept: HEMATOLOGY ONCOLOGY | Facility: CLINIC | Age: 70
End: 2020-06-01

## 2020-06-01 ENCOUNTER — APPOINTMENT (OUTPATIENT)
Dept: HEMATOLOGY ONCOLOGY | Facility: CLINIC | Age: 70
End: 2020-06-01
Payer: COMMERCIAL

## 2020-06-01 PROCEDURE — 99215 OFFICE O/P EST HI 40 MIN: CPT | Mod: 95

## 2020-06-02 NOTE — HISTORY OF PRESENT ILLNESS
[Disease: _____________________] : Disease: [unfilled] [Home] : at home, [unfilled] , at the time of the visit. 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 \par \par She underwent right breast lumpectomy with Dr Morales on 10/25/2016. 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 Margins were negative with additional features of ADH and fibroadenoma seen. ER was 100 % and MA was reported to be 80 %. Oncotype for DCIS showed a recurrence score of 7, suggesting 10 % risk of DCIS or invasive cancer. She has completed radiation treatment with Accelerated Partial Breast Radiation.\par \par \par  [de-identified] : She has been taking Arimidex for the past 2 years and has been tolerating it well. She is currently taking Calcium 600 mg with Vitamin D.  She needs to follow up with GYN. Up to date with colonoscopy ( to return this year as she had a polyp.).\par \par She is c/o pain in her joints. C/O myalgia.\par She was advised simvastatin which she took for 2 months and then dced due to myalgia.\par \par 5/13/19:\par Back on Zocor 10mg.\par Colonoscopy in Feb 2019 - No polyps. \par \par  6/1/20\par Pt is being eval for follow up via telehealth.\par She states she was diagnosed with Head and Neck cancer in Feb 2020.\par She was emergently evaluated in [facility] and underwent tracheostomy and PEG placement. This was followed by Chemo and RT which she has completed recently. She is scheduled to undergo PET scan in 2 months.\par She is still not able to swallow food. She is able to talk.\par All this time she has has remained compliant with Arimidex which she crushes and takes thru PEG. She has not been able to take Ca + D.\par Last mammo was in 2/20.\par No breast related complaints\par On Anastrazole since Jan 2017. \par B/l mammo in 02/2019. Next one in 6 months.\par DEXA in 02/2019 showed osteopenia (T -1.6)\par CBC, CMP normal in 02/2019.\par \par 11/4/19\par  Patient here for follow up, feeling well.  She denies any new complaints.  Patient denies any new palpable breast lumps or pain, denies skin changes, denies nipple discharge.  Patient denies cough, shortness of breath, denies fever, denies bone pain.\par diagnosed with GERD and hiatal hernia\par

## 2020-06-02 NOTE — PHYSICAL EXAM
[Restricted in physically strenuous activity but ambulatory and able to carry out work of a light or sedentary nature] : Status 1- Restricted in physically strenuous activity but ambulatory and able to carry out work of a light or sedentary nature, e.g., light house work, office work [Normal] : grossly intact [de-identified] : Not in distress [de-identified] : Tracheostomy in place

## 2020-06-02 NOTE — ASSESSMENT
[FreeTextEntry1] : DCIS of the right breast. On Anastrazole since Jan 2017. \par H/o ADH on the right breast s/p lumpectomy in 2015.\par Osteopenia \par New diagnosis of head and neck cancer s/p chemo/RT tracheostomy and PEG placement\par \par RECOMMENDATION:\par She will continue with Arimidex. It was renewed. She was advised to continue Ca and Vit D ( she can get tablets and crush and administer thru PEG. \par The side effects from such treatment were discussed in detail including but not limited to hot flashes, weight gain, hair thinning, arthralgia, myalgia, bone loss, increased risk of osteoporotic fractures and thrombo-embolism.\par Her compliance and any side effects from such treatment were assessed at today's visit\par \par Exercise regularly.\par Next diag Rt mammo due in 8/20. Results of Mammogram from 2/20 discussed. Pt is not sure if she will go for mammo in 8/20 due to issues with follow up of Head and neck cancer.\par results of MRI breast and mammogram reviewed \par DEXA in 02/2019 showed osteopenia. \par \par \par Pt advised to send her medical records from [facility]. Proceed with PET scan in 2 months.\par Speech and swallow eval.\par ENT follow up\par  All of her questions and concerns were addressed.\par \par RTC in 4 months.\par

## 2020-06-04 ENCOUNTER — APPOINTMENT (OUTPATIENT)
Dept: CARDIOLOGY | Facility: CLINIC | Age: 70
End: 2020-06-04

## 2020-08-10 ENCOUNTER — RESULT REVIEW (OUTPATIENT)
Age: 70
End: 2020-08-10

## 2020-08-10 ENCOUNTER — OUTPATIENT (OUTPATIENT)
Dept: OUTPATIENT SERVICES | Facility: HOSPITAL | Age: 70
LOS: 1 days | Discharge: HOME | End: 2020-08-10
Payer: COMMERCIAL

## 2020-08-10 DIAGNOSIS — R92.8 OTHER ABNORMAL AND INCONCLUSIVE FINDINGS ON DIAGNOSTIC IMAGING OF BREAST: ICD-10-CM

## 2020-08-10 PROCEDURE — G0279: CPT | Mod: 26

## 2020-08-10 PROCEDURE — 77065 DX MAMMO INCL CAD UNI: CPT | Mod: 26,RT

## 2020-09-29 ENCOUNTER — OUTPATIENT (OUTPATIENT)
Dept: OUTPATIENT SERVICES | Facility: HOSPITAL | Age: 70
LOS: 1 days | Discharge: HOME | End: 2020-09-29

## 2020-09-29 ENCOUNTER — APPOINTMENT (OUTPATIENT)
Dept: HEMATOLOGY ONCOLOGY | Facility: CLINIC | Age: 70
End: 2020-09-29
Payer: COMMERCIAL

## 2020-09-29 VITALS
DIASTOLIC BLOOD PRESSURE: 81 MMHG | BODY MASS INDEX: 23.56 KG/M2 | HEART RATE: 71 BPM | TEMPERATURE: 98.4 F | WEIGHT: 138 LBS | SYSTOLIC BLOOD PRESSURE: 148 MMHG | HEIGHT: 64 IN

## 2020-09-29 DIAGNOSIS — C76.0 MALIGNANT NEOPLASM OF HEAD, FACE AND NECK: ICD-10-CM

## 2020-09-29 DIAGNOSIS — Z79.811 LONG TERM (CURRENT) USE OF AROMATASE INHIBITORS: ICD-10-CM

## 2020-09-29 PROCEDURE — 99214 OFFICE O/P EST MOD 30 MIN: CPT

## 2020-09-29 NOTE — END OF VISIT
[Time Spent: ___ minutes] : I have spent [unfilled] minutes of time on the encounter. [FreeTextEntry3] : I was physically present for the key portions of the evaluation and management service provided.  I agree with the history and physical, and plan which I have reviewed and edited where appropriate.\par

## 2020-09-29 NOTE — RESULTS/DATA
[FreeTextEntry1] : EXAM:  MG MAMMO DIAG W SYLWIA RT#\par \par \par PROCEDURE DATE:  08/10/2020\par \par INTERPRETATION:  Clinical History / Reason for exam: Follow-up right breast calcifications.\par \par The patient reports her last clinical breast examination was performed about uncertain.\par \par Family history: There is no family history of breast cancer.\par \par Comparisons: Mammograms dating back to 2017.\par \par Views obtained: right full field digital 2D and digital tomosynthesis images as well as magnification views.\par \par Computer-aided detection was utilized in the interpretation of this examination.\par \par Breast composition: There are scattered areas of fibroglandular density.\par \par Findings:\par \par Mammogram:\par Stable probably benign calcifications are noted in the lateral right breast. Short interval follow-up with mammogram recommended.\par Unchanged architectural distortion at the lumpectomy site in the right breast.\par No new suspicious mass, microcalcifications or areas of architectural distortion is seen the right breast.\par \par \par Impression:\par 1.Right breast stable probably benign calcifications. Short interval follow-up with mammogram recommended.\par \par 2.Unchanged architectural distortion at the lumpectomy site in the right breast.\par \par Recommendation: Follow-up bilateral diagnostic mammogram in 6 months.\par \par BI-RADS category 3: Probably Benign\par \par \par \par \par \par \par \par \par \par \par \par \par \par \par \par Patient Name:  GIA PLASCENCIA	Patient ID:  [id number]\par Patient :   1950	Gender:   Female\par Accession Number:   [id number]	Study Date:   24 Aug 2020 09:22\par Referring Phys.:  HERNANDO REYNA	Performing Location:   [facility]\par EXAM:  PET CT FDG SKULL BASE\par \par \par IMPRESSION:\par \par 1. Complete anatomic and metabolic resolution of the previously seen FDG avid right supraglottic laryngeal mass and subcentimeter right level 2A lymph node.\par \par 2. 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 PET scan for restaging.\par \par Additional history of right breast cancer, right lumpectomy and radiation in 2016.\par \par EXAM CATEGORY:\par \par Subsequent treatment strategy.\par \par TECHNIQUE:\par \par 60 minutes following injection of the radiopharmaceutical, PET/CT imaging was performed from the skull base to mid thigh. Additional dedicated neck imaging was also performed.. Fasting serum glucose was 102 mg/dL prior to injection. Oral or IV contrast was not given per protocol. All SUV values reported represent maximum SUV (SUV max) unless otherwise specified.\par \par This study was interpreted using a lean body mass corrected SUV technique. Please note this may result in lower SUV values compared to a body-weight corrected technique. If there is a prior PET/CT for comparison, please see this current report for lean body mass corrected SUV values for the current study and the prior study.\par \par COMPARISON:\par \par 3/2/2020 PET/CT\par \par FINDINGS:\par \par Head and neck:\par \par There is interval complete anatomic and metabolic resolution of the previously seen FDG avid soft tissue mass involving the right pyriform sinus and right false cord and subcentimeter right level 2A lymph node.\par \par No significant head and neck lymphadenopathy on CT. The thyroid gland is mildly heterogeneous which is not hypermetabolic.\par \par Chest:\par \par There is no suspicious FDG uptake in the chest.\par \par The patient is status post right lumpectomy. Postsurgical changes and surgical clips in the outer right breast are again seen. There is no hypermetabolic lesion in the right breast or right axilla.\par \par On CT, there is no significant hilar, mediastinal or axillary lymphadenopathy. The heart and great vessels are unremarkable. Atherosclerotic aortic calcification and mitral valve ring calcification are noted. There is a small sebaceous cyst in the left axilla. No pleural or pericardial effusion. No significant lung nodule.\par \par Abdomen and pelvis:\par \par There is no suspicious FDG uptake in the abdomen/pelvis.\par \par On CT, left upper pole renal cyst, left extrarenal pelvis, atherosclerotic vascular calcification, PEG tube in place are reidentified. Otherwise, the solid organs of the abdomen and pelvis are unremarkable on unenhanced CT. No significant lymphadenopathy.\par \par Musculoskeletal and extremities:\par \par There are no suspicious FDG-avid osseous lesions.\par \par No aggressive osseous lesions are seen on CT. Degenerative changes in the spine are noted.\par \par Electronic Signature: I personally reviewed the images and agree with this report. Final Report: Dictated by and Signed by Attending Jenn Valiente MD 2020 10:30 AM\par \par \par \par \par \par \par \par \par \par \par \par \par \par \par \par \par \par \par Patient Name:  GIA PLASCENCIA	Patient ID:  [id number]\par Patient :   1950	Gender:   Female\par Accession Number:   [id number]	Study Date:   15 Aug 2020 09:19\par Referring Phys.:  LINNEA SIFUENTES	Performing Location:   [facility]\par EXAM:  null\par \par \par \par =================================================================\par \par ===========\par \par Bone Density Report\par =================================================================\par ===========\par \par Name: GIA PLASCENCIA\par Patient ID: [id number]\par Age: 70\par Sex: Female\par Ethnicity: White\par YOB: 1950\par \par -----------------------------------------------------------------\par -----------\par \par Indication: R/O OSTEOPOROSIS\par \par Referring Physician: LINNEA SIFUENTES\par \par Study: Bone densitometry was performed.\par \par Accession number: [id number]\par \par -----------------------------------------------------------------\par -----------\par \par Medical History: Follow-up\par -----------------------------------------------------------------\par -----------\par \par Menopause Age: 50\par -----------------------------------------------------------------\par -----------\par \par Bone Density:\par -----------------------------------------------------------------\par -----------\par \par Region Exam Date BMD T-Score Z-Score Class\par g/cm2\par -----------------------------------------------------------------\par -----------\par \par AP Spine L1-L4\par 08/15/2020 1.151 0.9 3.1 Normal\par Femoral Neck L\par 08/15/2020 0.617 -2.1 -0.3 Osteopenia\par Total Hip L\par 08/15/2020 0.846 -0.8 0.7 Normal\par -----------------------------------------------------------------\par -----------\par \par Previous Exams:\par -----------------------------------------------------------------\par -----------\par \par Region Age BMD T-Score BMD BMD\par Exam Date g/cm2 vs Baseline vs Previous\par -----------------------------------------------------------------\par -----------\par \par AP Spine(L1-L4)\par 08/15/2020 70 1.151 0.9 -9.5%* -9.5%*\par 2019 68 1.272 2.0\par \par Total Hip(Left)\par 08/15/2020 70 0.846 -0.8 -3.1% -3.1%\par 2019 68 0.873 -0.6\par \par * Indicates significant change\par \par IMPRESSION:\par \par FEMORAL NECK OSTEOPENIA\par \par World Health Organization criteria for BMD interpretation\par classify patients as Normal (T-score at or above -1.0),\par Osteopenic (T-score between -1.0 and\par -2.5), or Osteoporotic (T-score at or below -2.5).\par \par \par Electronic Signature: I personally reviewed the images and agree with this report. Final Report: Dictated by and Signed by Attending Nick Pina MD 2020 9:23 AM

## 2020-09-29 NOTE — ASSESSMENT
[FreeTextEntry1] : DCIS of the right breast. On Anastrazole since Jan 2017. \par H/o ADH on the right breast s/p lumpectomy in 2015.\par Osteopenia \par New diagnosis of head and neck cancer s/p chemo/RT tracheostomy and PEG placement. S/p 5 cycles of cisplatin and 5 weeks of RT.\par \par RECOMMENDATION:\par She will continue with Arimidex. It was renewed. She was advised to continue Ca and Vit D will try chewable vitamins.\par The side effects from such treatment were discussed in detail including but not limited to hot flashes, weight gain, hair thinning, arthralgia, myalgia, bone loss, increased risk of osteoporotic fractures and thrombo-embolism.\par Her compliance and any side effects from such treatment were assessed at today's visit\par \par Exercise regularly.\par Next b/l diagnostic mammogram due 2/2021. \par DEXA in 08/2020 showed osteopenia -2.1. Discussed starting prolia for osteopenia, pt is hesitiant, she will consider it, feel this is best choice given swallowing difficulties with larger medication tablets. Needs dental clearance prior to starting.\par ENT follow up.\par  All of her questions and concerns were addressed.\par \par RTC in 4 months.\par \par Case was seen and discussed with Dr. Clark who agreed with the assessment and plan.\par \par

## 2020-09-29 NOTE — HISTORY OF PRESENT ILLNESS
[Disease: _____________________] : Disease: [unfilled] [Home] : at home, [unfilled] , at the time of the visit. [Medical Office: ([facility])___] : at the medical office located in  [Verbal consent obtained from patient] : the patient, [unfilled] [de-identified] : She is a 68 year old female patient who was previously diagnosed with right breast carcinoma in situ. \par \par She underwent right breast lumpectomy with Dr Morales on 10/25/2016. 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 Margins were negative with additional features of ADH and fibroadenoma seen. ER was 100 % and MA was reported to be 80 %. Oncotype for DCIS showed a recurrence score of 7, suggesting 10 % risk of DCIS or invasive cancer. She has completed radiation treatment with Accelerated Partial Breast Radiation.\par \par \par  [de-identified] : She has been taking Arimidex for the past 2 years and has been tolerating it well. She is currently taking Calcium 600 mg with Vitamin D.  She needs to follow up with GYN. Up to date with colonoscopy ( to return this year as she had a polyp.).\par \par She is c/o pain in her joints. C/O myalgia.\par She was advised simvastatin which she took for 2 months and then dced due to myalgia.\par \par 5/13/19:\par Back on Zocor 10mg.\par Colonoscopy in Feb 2019 - No polyps. \par \par  6/1/20\par Pt is being eval for follow up via telehealth.\par She states she was diagnosed with Head and Neck cancer in Feb 2020.\par She was emergently evaluated in [facility] and underwent tracheostomy and PEG placement. This was followed by Chemo and RT which she has completed recently. She is scheduled to undergo PET scan in 2 months.\par She is still not able to swallow food. She is able to talk.\par All this time she has has remained compliant with Arimidex which she crushes and takes thru PEG. She has not been able to take Ca + D.\par Last mammo was in 2/20.\par No breast related complaints\par On Anastrazole since Jan 2017. \par B/l mammo in 02/2019. Next one in 6 months.\par DEXA in 02/2019 showed osteopenia (T -1.6)\par CBC, CMP normal in 02/2019.\par \par 11/4/19\par  Patient here for follow up, feeling well.  She denies any new complaints.  Patient denies any new palpable breast lumps or pain, denies skin changes, denies nipple discharge.  Patient denies cough, shortness of breath, denies fever, denies bone pain.\par diagnosed with GERD and hiatal hernia\par \par 09/29/2020\par GIA PLASCENCIA a 70 year F is here today for follow up for breast cancer. \par Patient denies any new palpable breast lumps or pain, denies skin changes, denies nipple discharge.\par Complaint with arimidex daily, has trouble swallowing vitamin d and calcium and they are difficult to crush for PEG tube.\par Had right diagnostic mammogram 8/2020 stable benign calcifications of right breast. \par PET/CT 8/2020 done at [facility]: previous uptake of right supraglottic laryngeal mass completed resolved. \par DEXA 8/2020 osteopenia -2.1 femoral neck. \par Trach was removed 6/2020 healing well. Has scopes every 6 weeks with dr Sierra. PEG tube still in place will have it removed within the next month, cleared by speech and swallow. \par As per patient she received cisplatin x 5 cycles in [facility] completed in 4/2020, RT x 5 weeks as well.

## 2020-10-01 DIAGNOSIS — C76.0 MALIGNANT NEOPLASM OF HEAD, FACE AND NECK: ICD-10-CM

## 2020-10-01 DIAGNOSIS — M85.80 OTHER SPECIFIED DISORDERS OF BONE DENSITY AND STRUCTURE, UNSPECIFIED SITE: ICD-10-CM

## 2020-10-01 DIAGNOSIS — Z79.811 LONG TERM (CURRENT) USE OF AROMATASE INHIBITORS: ICD-10-CM

## 2020-10-01 DIAGNOSIS — D05.11 INTRADUCTAL CARCINOMA IN SITU OF RIGHT BREAST: ICD-10-CM

## 2020-10-08 ENCOUNTER — APPOINTMENT (OUTPATIENT)
Dept: CARDIOLOGY | Facility: CLINIC | Age: 70
End: 2020-10-08
Payer: COMMERCIAL

## 2020-10-08 VITALS
SYSTOLIC BLOOD PRESSURE: 130 MMHG | HEART RATE: 75 BPM | BODY MASS INDEX: 23.9 KG/M2 | WEIGHT: 140 LBS | HEIGHT: 64 IN | TEMPERATURE: 97.3 F | DIASTOLIC BLOOD PRESSURE: 80 MMHG

## 2020-10-08 PROCEDURE — 93000 ELECTROCARDIOGRAM COMPLETE: CPT

## 2020-10-08 PROCEDURE — 99214 OFFICE O/P EST MOD 30 MIN: CPT

## 2020-10-08 NOTE — HISTORY OF PRESENT ILLNESS
[FreeTextEntry1] : Justina is a 65-year-old white female, under my care for the past 4 years. \par \par The patient presented to this office with complaints of chest discomfort and  cardiovascular screening.\par \par Her workup was essentially negative in terms of ischemic heart disease.\par  She underwent a stress test which was normal and an echo which was also done at the same time which basically was normal.\par \par The patient does have mild hypertension for which he takes Accupril and hydrochlorothiazide.\par \par The patient does complain of chest pain. Atypical for heart and coronary disease. No symptoms of congestive heart failure. No new cardiac complaints.\par \par The patient is here for routine followup care.\par sill c/om chest pain- atypical\par  NO SOB\par uneventful 3 mos\par ros is negative\par \par stress test was negative\par echo is normal\par no tia, cva or pvd\par \par ros is essentially unchanged\par except for cancer of the larynx

## 2020-10-19 ENCOUNTER — APPOINTMENT (OUTPATIENT)
Dept: CARDIOLOGY | Facility: CLINIC | Age: 70
End: 2020-10-19
Payer: COMMERCIAL

## 2020-10-19 PROCEDURE — 93015 CV STRESS TEST SUPVJ I&R: CPT

## 2020-10-19 PROCEDURE — 93306 TTE W/DOPPLER COMPLETE: CPT

## 2020-10-19 PROCEDURE — 99072 ADDL SUPL MATRL&STAF TM PHE: CPT

## 2020-10-21 ENCOUNTER — APPOINTMENT (OUTPATIENT)
Dept: BREAST CENTER | Facility: CLINIC | Age: 70
End: 2020-10-21
Payer: COMMERCIAL

## 2020-10-21 VITALS
BODY MASS INDEX: 23.22 KG/M2 | DIASTOLIC BLOOD PRESSURE: 78 MMHG | WEIGHT: 136 LBS | SYSTOLIC BLOOD PRESSURE: 126 MMHG | TEMPERATURE: 97.2 F | HEIGHT: 64 IN

## 2020-10-21 DIAGNOSIS — R92.8 OTHER ABNORMAL AND INCONCLUSIVE FINDINGS ON DIAGNOSTIC IMAGING OF BREAST: ICD-10-CM

## 2020-10-21 PROCEDURE — 99213 OFFICE O/P EST LOW 20 MIN: CPT

## 2020-10-22 NOTE — DATA REVIEWED
[FreeTextEntry1] : Right Uni Dx Mammo - 08/10/2020:\par Breast composition: There are scattered areas of fibroglandular density.\par \par Findings:\par \par Mammogram:\par Stable probably benign calcifications are noted in the lateral right breast. Short interval follow-up with mammogram recommended.\par Unchanged architectural distortion at the lumpectomy site in the right breast.\par No new suspicious mass, microcalcifications or areas of architectural distortion is seen the right breast.\par \par \par Impression:\par 1.Right breast stable probably benign calcifications. Short interval follow-up with mammogram recommended.\par \par 2.Unchanged architectural distortion at the lumpectomy site in the right breast.\par \par Recommendation: Follow-up bilateral diagnostic mammogram in 6 months.\par \par BI-RADS category 3: Probably Benign\par \par \par \par

## 2020-10-22 NOTE — ASSESSMENT
[FreeTextEntry1] : GIA is a carmen 70 year old patient who presented today in follow up for a history of right breast DCIS; \par BIRADS-3 imaging.  \par She has no new breast related complaints. \par She was diagnosed with head and neck cancer in February 2020 and has been undergoing treatment.\par Imaging of the right breast was done recently which revealed right breast stable probably benign calcifications. Short interval follow-up with mammogram recommended.  Unchanged architectural distortion at the lumpectomy site in the right breast, as detailed above. \par Physical exam was unrevealing today - left axillary tail sebaceous cyst noted.\par \par She will return to the office for excision of sebaceous cyst.\par Otherwise, imaging with a bilateral diagnostic mammogram will be due in February 2021, \par and that will be scheduled today. \par \par I spent a total of 15 minutes of face to face time with this patient, greater than 50% of which was spent in counseling and/or coordination of care.\par All of her questions were appropriately answered.\par She knows to call with any concerns.\par

## 2020-10-22 NOTE — PHYSICAL EXAM
[Normocephalic] : normocephalic [Atraumatic] : atraumatic [No Supraclavicular Adenopathy] : no supraclavicular adenopathy [No Cervical Adenopathy] : no cervical adenopathy [Examined in the supine and seated position] : examined in the supine and seated position [No dominant masses] : no dominant masses in right breast  [No Nipple Discharge] : no left nipple discharge [No Axillary Lymphadenopathy] : no left axillary lymphadenopathy [No Rashes] : no rashes [No Ulceration] : no ulceration [Breast Nipple Inversion] : nipples not inverted [Breast Nipple Retraction] : nipples not retracted [de-identified] : well healed surgical scar. [de-identified] : left axillary tail sebaceous cyst.

## 2020-10-22 NOTE — HISTORY OF PRESENT ILLNESS
[FreeTextEntry1] : Problem List:\par 1. 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 (dx'd on MRI - 09/2016)\par 2. s/p Right NG Lumpectomy - 10/25/16.\par 3. h/o Right ADH/ALH \par 4. s/p PBI external beam - completed 12/2016.\par 5. Dr. Amber Cheney\par 6. s/p Right breast MRI guided bx - benign concordant - March 2018.\par \par GIA PLASCENCIA is a 70 year old female patient who presents today in follow up for history of \par right breast DCIS.\par Since her last visit, she has no new breast related complaints. \par \par She was diagnosed with head and neck cancer in February 2020 and has been undergoing treatment.

## 2020-10-23 ENCOUNTER — APPOINTMENT (OUTPATIENT)
Dept: RADIATION ONCOLOGY | Facility: HOSPITAL | Age: 70
End: 2020-10-23

## 2020-11-10 ENCOUNTER — LABORATORY RESULT (OUTPATIENT)
Age: 70
End: 2020-11-10

## 2020-11-10 ENCOUNTER — APPOINTMENT (OUTPATIENT)
Dept: BREAST CENTER | Facility: CLINIC | Age: 70
End: 2020-11-10
Payer: COMMERCIAL

## 2020-11-10 PROCEDURE — 19120 REMOVAL OF BREAST LESION: CPT

## 2020-11-10 PROCEDURE — 99072 ADDL SUPL MATRL&STAF TM PHE: CPT

## 2020-11-10 NOTE — PAST MEDICAL HISTORY
[Postmenopausal] : The patient is postmenopausal [Menarche Age ____] : age at menarche was [unfilled] [Menopause Age____] : age at menopause was [unfilled] [Total Preg ___] : G[unfilled] [History of Hormone Replacement Treatment] : has no history of hormone replacement treatment [FreeTextEntry6] : No. [FreeTextEntry7] : Yes.

## 2020-11-10 NOTE — PROCEDURE
[FreeTextEntry1] : Excision of left axilla sebaceous cyst [FreeTextEntry2] : enlarging [FreeTextEntry3] : Patient prepped and draped in usual fashion.  5 cc of lido/epi injected.  1.5 cm elliptical incision created around 8 mm sebaceous cyst with 15 blade scalpel.  Cyst was removed in its entirety.  Wound was irrigated.  Hemostasis achieved with cautery.  SubQ tissue approximated with 4-0 vicryl and skin closed with running subcuticular 4-0 PDS.  Dressing applied.  Patient tolerated the procedure well. [With Epi] : with epinephrine

## 2020-11-10 NOTE — HISTORY OF PRESENT ILLNESS
[FreeTextEntry1] : Problem List:\par 1. 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 (dx'd on MRI - 09/2016)\par 2. s/p Right NG Lumpectomy - 10/25/16.\par 3. h/o Right ADH/ALH \par 4. s/p PBI external beam - completed 12/2016.\par 5. Dr. Amber Cheney\par 6. s/p Right breast MRI guided bx - benign concordant - March 2018.\par 7. She was diagnosed with head and neck cancer in February 2020 and has been undergoing treatment.\par \par GIA PLASCENCIA is a 70 year old female patient who presents today in follow up for history of \par right breast DCIS.\par She presents today tor excision of a sebaceous cyst.

## 2020-11-17 ENCOUNTER — APPOINTMENT (OUTPATIENT)
Dept: BREAST CENTER | Facility: CLINIC | Age: 70
End: 2020-11-17
Payer: COMMERCIAL

## 2020-11-17 VITALS
BODY MASS INDEX: 23.22 KG/M2 | HEIGHT: 64 IN | TEMPERATURE: 97.2 F | SYSTOLIC BLOOD PRESSURE: 124 MMHG | WEIGHT: 136 LBS | DIASTOLIC BLOOD PRESSURE: 80 MMHG

## 2020-11-17 DIAGNOSIS — N60.82 OTHER BENIGN MAMMARY DYSPLASIAS OF LEFT BREAST: ICD-10-CM

## 2020-11-17 PROCEDURE — 99024 POSTOP FOLLOW-UP VISIT: CPT

## 2020-11-17 NOTE — HISTORY OF PRESENT ILLNESS
[FreeTextEntry1] : Problem List:\par 1. 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 (dx'd on MRI - 09/2016)\par 2. s/p Right NG Lumpectomy - 10/25/16.\par 3. h/o Right ADH/ALH \par 4. s/p PBI external beam - completed 12/2016.\par 5. Dr. Amber Cheney\par 6. s/p Right breast MRI guided bx - benign concordant - March 2018.\par 7. She was diagnosed with head and neck cancer in February 2020 and has been undergoing treatment.\par \par GIA PLASCENCIA is a 70 year old female patient who presents today in follow up for history of \par right breast DCIS.\par She is s/p excision of a sebaceous cyst on 11/10/2020.

## 2020-11-17 NOTE — DATA REVIEWED
[FreeTextEntry1] : Surgical Final Report\par \par Final Diagnosis\par Breast, left axillary mass, excision:\par            - Epidermal inclusion cyst.

## 2020-11-17 NOTE — ASSESSMENT
[FreeTextEntry1] : GIA is a carmen 70 year old patient who presented today to the office - she is status post excision of left axillary sebaceous cyst.\par She is feeling well.\par She denies any fever / chills or erythema and / or drainage related to the incision.\par Her pain is well controlled, only complains of mild soreness of the area.\par Her suture was removed and pathology was discussed.\par \par Plan:\par 1. She is due for B/L Dx Mammo in February 2021.\par 2. She will return for follow-up and clinical breast exam at that time as well.\par \par I spent a total of 15 minutes of face to face time with this patient, greater than 50% of which was spent in counseling and/or coordination of care.\par All of her questions were appropriately answered.\par She knows to call with any concerns.

## 2020-11-17 NOTE — REVIEW OF SYSTEMS
[Negative] : Constitutional [Fever] : no fever [Chills] : no chills [Breast Pain] : no breast pain [Breast Swelling] : no breast swelling [Breast Reddening] : no reddening of the breast

## 2020-12-19 ENCOUNTER — TRANSCRIPTION ENCOUNTER (OUTPATIENT)
Age: 70
End: 2020-12-19

## 2021-01-18 ENCOUNTER — APPOINTMENT (OUTPATIENT)
Dept: HEMATOLOGY ONCOLOGY | Facility: CLINIC | Age: 71
End: 2021-01-18

## 2021-02-15 ENCOUNTER — RESULT REVIEW (OUTPATIENT)
Age: 71
End: 2021-02-15

## 2021-02-15 ENCOUNTER — OUTPATIENT (OUTPATIENT)
Dept: OUTPATIENT SERVICES | Facility: HOSPITAL | Age: 71
LOS: 1 days | Discharge: HOME | End: 2021-02-15
Payer: MEDICARE

## 2021-02-15 DIAGNOSIS — R92.8 OTHER ABNORMAL AND INCONCLUSIVE FINDINGS ON DIAGNOSTIC IMAGING OF BREAST: ICD-10-CM

## 2021-02-15 PROCEDURE — G0279: CPT | Mod: 26

## 2021-02-15 PROCEDURE — 77066 DX MAMMO INCL CAD BI: CPT | Mod: 26

## 2021-03-23 ENCOUNTER — APPOINTMENT (OUTPATIENT)
Dept: BREAST CENTER | Facility: CLINIC | Age: 71
End: 2021-03-23
Payer: MEDICARE

## 2021-03-23 PROCEDURE — 99213 OFFICE O/P EST LOW 20 MIN: CPT

## 2021-03-23 NOTE — HISTORY OF PRESENT ILLNESS
[FreeTextEntry1] : Problem List:\par 1. 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 (dx'd on MRI - 09/2016)\par 2. s/p Right NLOC - 10/25/16.\par 3. h/o Right ADH/ALH \par 4. s/p PBI external beam - completed 12/2016.\par 5. Dr. Amber Cheney\par 6. s/p Right breast MRI guided bx - benign concordant - March 2018.\par 7. She was diagnosed with head and neck cancer in February 2020 and has been undergoing treatment.\par 8. She is s/p excision of a sebaceous cyst left breast/axilla on 11/10/2020.\par \par GIA PLASCENCIA is a 70 year old female patient who presents today in follow up for history of \par right breast DCIS.\par Since her last visit, she has no new breast related complaints. \par Imaging was done on 02/15/2021, which revealed no mammographic evidence of malignancy. Stable benign right breast calcifications and lumpectomy changes.\par \par She presents today for evaluation and imaging review.\par \par

## 2021-03-23 NOTE — PHYSICAL EXAM
[Normocephalic] : normocephalic [Atraumatic] : atraumatic [No Supraclavicular Adenopathy] : no supraclavicular adenopathy [No Cervical Adenopathy] : no cervical adenopathy [Examined in the supine and seated position] : examined in the supine and seated position [No dominant masses] : no dominant masses in right breast  [No dominant masses] : no dominant masses left breast [No Nipple Discharge] : no left nipple discharge [No Axillary Lymphadenopathy] : no left axillary lymphadenopathy [No Rashes] : no rashes [No Ulceration] : no ulceration [Breast Nipple Inversion] : nipples not inverted [Breast Nipple Retraction] : nipples not retracted [de-identified] : bilateral scattered areas of fibroglandular densities. \par well healed right surgical scar with palpable scar tissue.

## 2021-03-23 NOTE — ASSESSMENT
[FreeTextEntry1] : GIA is a carmen 70 year old patient who presented today in follow up for a history of right breast DCIS.  \par She has been doing well with no new breast related complaints. \par Imaging was done recently which revealed no mammographic evidence of malignancy. Stable benign right breast calcifications and lumpectomy changes, as detailed above. \par Physical exam was unrevealing today.\par \par The calcifications that we have been following are again seen the lateral right breast and appear stable. \par Given their long-term stability, these are benign in etiology\par \par Imaging with a bilateral screening mammogram and sonogram will be ordered for February 2022, and that will be scheduled today. \par She will return for follow-up and clinical breast exam in February 2022 following imaging.\par \par I spent a total of 20 minutes of face to face time with this patient, greater than 50% of which was spent in counseling and/or coordination of care.\par All of her questions were appropriately answered.\par She knows to call with any concerns.\par

## 2021-03-23 NOTE — DATA REVIEWED
[FreeTextEntry1] : B/L Dx Mammo - 02/15/2021:\par Breast composition:There are scattered areas of fibroglandular density.\par \par Findings:\par \par Mammogram:\par \par Stable calcifications are again seen the lateral right breast. Given their long-term stability, these are benign in etiology. Stable postlumpectomy changes are again seen in the right breast. No suspicious mass, microcalcifications or areas of architectural distortion is seen either breast. When compared to the previous examinations there is no significant interval change and nothing to suggest malignancy.\par \par Impression: No mammographic evidence of malignancy. Stable benign right breast calcifications and lumpectomy changes.\par \par Recommendation: Unless otherwise indicated by clinical findings, annual screening mammography recommended.\par \par BI-RADS Category 2: Benign\par

## 2021-04-22 ENCOUNTER — APPOINTMENT (OUTPATIENT)
Dept: CARDIOLOGY | Facility: CLINIC | Age: 71
End: 2021-04-22
Payer: MEDICARE

## 2021-04-22 ENCOUNTER — RESULT CHARGE (OUTPATIENT)
Age: 71
End: 2021-04-22

## 2021-04-22 VITALS
WEIGHT: 138 LBS | HEART RATE: 70 BPM | TEMPERATURE: 97.2 F | BODY MASS INDEX: 23.69 KG/M2 | SYSTOLIC BLOOD PRESSURE: 140 MMHG | DIASTOLIC BLOOD PRESSURE: 82 MMHG

## 2021-04-22 PROCEDURE — 93000 ELECTROCARDIOGRAM COMPLETE: CPT

## 2021-04-22 PROCEDURE — 99214 OFFICE O/P EST MOD 30 MIN: CPT

## 2021-04-22 RX ORDER — METOCLOPRAMIDE 5 MG/1
5 TABLET ORAL 3 TIMES DAILY
Refills: 0 | Status: DISCONTINUED | COMMUNITY
End: 2021-04-22

## 2021-04-22 NOTE — PHYSICAL EXAM
[General Appearance - Well Developed] : well developed [Normal Appearance] : normal appearance [Well Groomed] : well groomed [General Appearance - Well Nourished] : well nourished [No Deformities] : no deformities [General Appearance - In No Acute Distress] : no acute distress [Normal Conjunctiva] : the conjunctiva exhibited no abnormalities [Eyelids - No Xanthelasma] : the eyelids demonstrated no xanthelasmas [Normal Oral Mucosa] : normal oral mucosa [No Oral Pallor] : no oral pallor [No Oral Cyanosis] : no oral cyanosis [Normal Jugular Venous A Waves Present] : normal jugular venous A waves present [Normal Jugular Venous V Waves Present] : normal jugular venous V waves present [No Jugular Venous Brown A Waves] : no jugular venous brown A waves [Respiration, Rhythm And Depth] : normal respiratory rhythm and effort [Exaggerated Use Of Accessory Muscles For Inspiration] : no accessory muscle use [Auscultation Breath Sounds / Voice Sounds] : lungs were clear to auscultation bilaterally [Heart Rate And Rhythm] : heart rate and rhythm were normal [Murmurs] : no murmurs present [Heart Sounds] : normal S1 and S2 [Abdomen Soft] : soft [Abdomen Tenderness] : non-tender [Abdomen Mass (___ Cm)] : no abdominal mass palpated [Abnormal Walk] : normal gait [Gait - Sufficient For Exercise Testing] : the gait was sufficient for exercise testing [Nail Clubbing] : no clubbing of the fingernails [Cyanosis, Localized] : no localized cyanosis [Petechial Hemorrhages (___cm)] : no petechial hemorrhages [Skin Color & Pigmentation] : normal skin color and pigmentation [] : no rash [No Venous Stasis] : no venous stasis [Skin Lesions] : no skin lesions [No Skin Ulcers] : no skin ulcer [No Xanthoma] : no  xanthoma was observed [Oriented To Time, Place, And Person] : oriented to person, place, and time [Affect] : the affect was normal [Mood] : the mood was normal [No Anxiety] : not feeling anxious

## 2021-10-21 ENCOUNTER — APPOINTMENT (OUTPATIENT)
Dept: CARDIOLOGY | Facility: CLINIC | Age: 71
End: 2021-10-21
Payer: MEDICARE

## 2021-10-21 VITALS
DIASTOLIC BLOOD PRESSURE: 82 MMHG | WEIGHT: 138 LBS | BODY MASS INDEX: 23.56 KG/M2 | SYSTOLIC BLOOD PRESSURE: 134 MMHG | HEIGHT: 64 IN | TEMPERATURE: 96.6 F | HEART RATE: 70 BPM

## 2021-10-21 PROCEDURE — 99214 OFFICE O/P EST MOD 30 MIN: CPT

## 2021-10-21 PROCEDURE — 93000 ELECTROCARDIOGRAM COMPLETE: CPT

## 2021-10-21 RX ORDER — ALPRAZOLAM 1 MG/1
1 TABLET ORAL
Refills: 0 | Status: ACTIVE | COMMUNITY

## 2021-10-21 NOTE — PHYSICAL EXAM
[Well Developed] : well developed [Well Nourished] : well nourished [No Acute Distress] : no acute distress [Normal Venous Pressure] : normal venous pressure [No Carotid Bruit] : no carotid bruit [Normal S1, S2] : normal S1, S2 [No Murmur] : no murmur [No Rub] : no rub [No Gallop] : no gallop [Clear Lung Fields] : clear lung fields [Good Air Entry] : good air entry [No Respiratory Distress] : no respiratory distress  [Soft] : abdomen soft [Non Tender] : non-tender [No Masses/organomegaly] : no masses/organomegaly [Normal Bowel Sounds] : normal bowel sounds [Normal Gait] : normal gait [No Edema] : no edema [No Cyanosis] : no cyanosis [No Clubbing] : no clubbing [No Varicosities] : no varicosities [No Rash] : no rash [No Skin Lesions] : no skin lesions [Moves all extremities] : moves all extremities [No Focal Deficits] : no focal deficits [Normal Speech] : normal speech [Alert and Oriented] : alert and oriented [Normal memory] : normal memory [General Appearance - Well Developed] : well developed [Normal Appearance] : normal appearance [Well Groomed] : well groomed [General Appearance - Well Nourished] : well nourished [No Deformities] : no deformities [General Appearance - In No Acute Distress] : no acute distress [Normal Conjunctiva] : the conjunctiva exhibited no abnormalities [Eyelids - No Xanthelasma] : the eyelids demonstrated no xanthelasmas [Normal Oral Mucosa] : normal oral mucosa [No Oral Pallor] : no oral pallor [No Oral Cyanosis] : no oral cyanosis [Normal Jugular Venous A Waves Present] : normal jugular venous A waves present [Normal Jugular Venous V Waves Present] : normal jugular venous V waves present [No Jugular Venous Brown A Waves] : no jugular venous brown A waves [Respiration, Rhythm And Depth] : normal respiratory rhythm and effort [Exaggerated Use Of Accessory Muscles For Inspiration] : no accessory muscle use [Auscultation Breath Sounds / Voice Sounds] : lungs were clear to auscultation bilaterally [Heart Rate And Rhythm] : heart rate and rhythm were normal [Heart Sounds] : normal S1 and S2 [Murmurs] : no murmurs present [Abdomen Soft] : soft [Abdomen Tenderness] : non-tender [Abdomen Mass (___ Cm)] : no abdominal mass palpated [Abnormal Walk] : normal gait [Gait - Sufficient For Exercise Testing] : the gait was sufficient for exercise testing [Nail Clubbing] : no clubbing of the fingernails [Cyanosis, Localized] : no localized cyanosis [Petechial Hemorrhages (___cm)] : no petechial hemorrhages [Skin Color & Pigmentation] : normal skin color and pigmentation [] : no rash [No Venous Stasis] : no venous stasis [Skin Lesions] : no skin lesions [No Skin Ulcers] : no skin ulcer [No Xanthoma] : no  xanthoma was observed [Oriented To Time, Place, And Person] : oriented to person, place, and time [Affect] : the affect was normal [Mood] : the mood was normal [No Anxiety] : not feeling anxious

## 2022-02-24 ENCOUNTER — APPOINTMENT (OUTPATIENT)
Dept: BREAST CENTER | Facility: CLINIC | Age: 72
End: 2022-02-24

## 2022-03-16 ENCOUNTER — RESULT CHARGE (OUTPATIENT)
Age: 72
End: 2022-03-16

## 2022-03-16 ENCOUNTER — APPOINTMENT (OUTPATIENT)
Dept: CARDIOLOGY | Facility: CLINIC | Age: 72
End: 2022-03-16
Payer: MEDICARE

## 2022-03-16 VITALS
HEIGHT: 64 IN | HEART RATE: 69 BPM | SYSTOLIC BLOOD PRESSURE: 132 MMHG | WEIGHT: 139 LBS | BODY MASS INDEX: 23.73 KG/M2 | DIASTOLIC BLOOD PRESSURE: 76 MMHG | TEMPERATURE: 97.5 F

## 2022-03-16 PROCEDURE — 93000 ELECTROCARDIOGRAM COMPLETE: CPT

## 2022-03-16 PROCEDURE — 99214 OFFICE O/P EST MOD 30 MIN: CPT

## 2022-03-16 NOTE — PHYSICAL EXAM
[Well Developed] : well developed [Well Nourished] : well nourished [No Acute Distress] : no acute distress [Normal Venous Pressure] : normal venous pressure [No Carotid Bruit] : no carotid bruit [Normal S1, S2] : normal S1, S2 [No Murmur] : no murmur [No Rub] : no rub [No Gallop] : no gallop [Clear Lung Fields] : clear lung fields [Good Air Entry] : good air entry [No Respiratory Distress] : no respiratory distress  [Soft] : abdomen soft [Non Tender] : non-tender [No Masses/organomegaly] : no masses/organomegaly [Normal Bowel Sounds] : normal bowel sounds [Normal Gait] : normal gait [No Edema] : no edema [No Cyanosis] : no cyanosis [No Clubbing] : no clubbing [No Varicosities] : no varicosities [No Rash] : no rash [No Skin Lesions] : no skin lesions [Moves all extremities] : moves all extremities [No Focal Deficits] : no focal deficits [Normal Speech] : normal speech [Alert and Oriented] : alert and oriented [Normal memory] : normal memory [General Appearance - Well Developed] : well developed [Normal Appearance] : normal appearance [Well Groomed] : well groomed [General Appearance - Well Nourished] : well nourished [No Deformities] : no deformities [General Appearance - In No Acute Distress] : no acute distress [Normal Conjunctiva] : the conjunctiva exhibited no abnormalities [Eyelids - No Xanthelasma] : the eyelids demonstrated no xanthelasmas [Normal Oral Mucosa] : normal oral mucosa [No Oral Pallor] : no oral pallor [No Oral Cyanosis] : no oral cyanosis [Normal Jugular Venous A Waves Present] : normal jugular venous A waves present [Normal Jugular Venous V Waves Present] : normal jugular venous V waves present [No Jugular Venous Brown A Waves] : no jugular venous brown A waves [Respiration, Rhythm And Depth] : normal respiratory rhythm and effort [Auscultation Breath Sounds / Voice Sounds] : lungs were clear to auscultation bilaterally [Exaggerated Use Of Accessory Muscles For Inspiration] : no accessory muscle use [Heart Rate And Rhythm] : heart rate and rhythm were normal [Heart Sounds] : normal S1 and S2 [Murmurs] : no murmurs present [Abdomen Tenderness] : non-tender [Abdomen Soft] : soft [Abdomen Mass (___ Cm)] : no abdominal mass palpated [Abnormal Walk] : normal gait [Gait - Sufficient For Exercise Testing] : the gait was sufficient for exercise testing [Nail Clubbing] : no clubbing of the fingernails [Cyanosis, Localized] : no localized cyanosis [Petechial Hemorrhages (___cm)] : no petechial hemorrhages [Skin Color & Pigmentation] : normal skin color and pigmentation [] : no rash [No Venous Stasis] : no venous stasis [Skin Lesions] : no skin lesions [No Skin Ulcers] : no skin ulcer [No Xanthoma] : no  xanthoma was observed [Oriented To Time, Place, And Person] : oriented to person, place, and time [Affect] : the affect was normal [Mood] : the mood was normal [No Anxiety] : not feeling anxious

## 2022-09-27 ENCOUNTER — RESULT CHARGE (OUTPATIENT)
Age: 72
End: 2022-09-27

## 2022-09-27 ENCOUNTER — APPOINTMENT (OUTPATIENT)
Dept: CARDIOLOGY | Facility: CLINIC | Age: 72
End: 2022-09-27

## 2022-09-27 VITALS
BODY MASS INDEX: 23.05 KG/M2 | SYSTOLIC BLOOD PRESSURE: 120 MMHG | DIASTOLIC BLOOD PRESSURE: 76 MMHG | WEIGHT: 135 LBS | HEART RATE: 66 BPM | HEIGHT: 64 IN

## 2022-09-27 PROCEDURE — 93000 ELECTROCARDIOGRAM COMPLETE: CPT

## 2022-09-27 PROCEDURE — 99214 OFFICE O/P EST MOD 30 MIN: CPT

## 2022-09-27 NOTE — HISTORY OF PRESENT ILLNESS
[FreeTextEntry1] : Justina is a 72-year-old white female, under my care for the past 4 years. \par \par The patient presented to this office with complaints of chest discomfort and  cardiovascular screening.\par \par Her workup was essentially negative in terms of ischemic heart disease.\par  She underwent a stress test which was normal and an echo which was also done at the same time which basically was normal.\par \par The patient does have mild hypertension for which he takes Accupril and hydrochlorothiazide.\par \par The patient does complain of chest pain. Atypical for heart and coronary disease. No symptoms of congestive heart failure. No new cardiac complaints.\par \par The patient is here for routine followup care.\par sill c/om chest pain- atypical\par  NO SOB\par uneventful 3 mos\par ros is negative\par \par stress test was negative\par echo is normal\par no tia, cva or pvd\par \par ros is essentially unchanged\par except for cancer of the larynx

## 2022-12-23 ENCOUNTER — EMERGENCY (EMERGENCY)
Facility: HOSPITAL | Age: 72
LOS: 0 days | Discharge: HOME | End: 2022-12-23
Attending: EMERGENCY MEDICINE | Admitting: EMERGENCY MEDICINE
Payer: MEDICARE

## 2022-12-23 VITALS
DIASTOLIC BLOOD PRESSURE: 74 MMHG | RESPIRATION RATE: 20 BRPM | HEART RATE: 103 BPM | SYSTOLIC BLOOD PRESSURE: 160 MMHG | WEIGHT: 132.06 LBS | OXYGEN SATURATION: 99 % | TEMPERATURE: 98 F

## 2022-12-23 DIAGNOSIS — R30.0 DYSURIA: ICD-10-CM

## 2022-12-23 DIAGNOSIS — Z88.8 ALLERGY STATUS TO OTHER DRUGS, MEDICAMENTS AND BIOLOGICAL SUBSTANCES STATUS: ICD-10-CM

## 2022-12-23 DIAGNOSIS — R31.9 HEMATURIA, UNSPECIFIED: ICD-10-CM

## 2022-12-23 DIAGNOSIS — Z85.3 PERSONAL HISTORY OF MALIGNANT NEOPLASM OF BREAST: ICD-10-CM

## 2022-12-23 DIAGNOSIS — E78.5 HYPERLIPIDEMIA, UNSPECIFIED: ICD-10-CM

## 2022-12-23 DIAGNOSIS — R35.0 FREQUENCY OF MICTURITION: ICD-10-CM

## 2022-12-23 DIAGNOSIS — Z85.21 PERSONAL HISTORY OF MALIGNANT NEOPLASM OF LARYNX: ICD-10-CM

## 2022-12-23 DIAGNOSIS — I10 ESSENTIAL (PRIMARY) HYPERTENSION: ICD-10-CM

## 2022-12-23 DIAGNOSIS — N39.0 URINARY TRACT INFECTION, SITE NOT SPECIFIED: ICD-10-CM

## 2022-12-23 LAB
APPEARANCE UR: ABNORMAL
BACTERIA # UR AUTO: ABNORMAL
BILIRUB UR-MCNC: NEGATIVE — SIGNIFICANT CHANGE UP
COLOR SPEC: YELLOW — SIGNIFICANT CHANGE UP
DIFF PNL FLD: ABNORMAL
EPI CELLS # UR: ABNORMAL /HPF
GLUCOSE UR QL: NEGATIVE MG/DL — SIGNIFICANT CHANGE UP
KETONES UR-MCNC: NEGATIVE — SIGNIFICANT CHANGE UP
LEUKOCYTE ESTERASE UR-ACNC: ABNORMAL
NITRITE UR-MCNC: NEGATIVE — SIGNIFICANT CHANGE UP
PH UR: 7 — SIGNIFICANT CHANGE UP (ref 5–8)
PROT UR-MCNC: 100 MG/DL
RBC CASTS # UR COMP ASSIST: >50 /HPF
SP GR SPEC: 1.02 — SIGNIFICANT CHANGE UP (ref 1.01–1.03)
UROBILINOGEN FLD QL: 0.2 MG/DL — SIGNIFICANT CHANGE UP
WBC UR QL: >50 /HPF

## 2022-12-23 PROCEDURE — 99283 EMERGENCY DEPT VISIT LOW MDM: CPT | Mod: FS

## 2022-12-23 RX ORDER — CEFPODOXIME PROXETIL 100 MG
1 TABLET ORAL
Qty: 14 | Refills: 0
Start: 2022-12-23 | End: 2022-12-29

## 2022-12-23 NOTE — ED PROVIDER NOTE - OBJECTIVE STATEMENT
Patient is a 72-year-old female history of laryngeal cancer, breast cancer in remission, hypertension, hyperlipidemia here for evaluation of urinary frequency, dysuria and hematuria since 5 AM this morning.  Patient denies flank pain, nausea, vomiting, fever, chills

## 2022-12-23 NOTE — ED ADULT NURSE NOTE - PRO INTERPRETER NEED 2
English Nasalis-Muscle-Based Myocutaneous Island Pedicle Flap Text: Using a #15 blade, an incision was made around the donor flap to the level of the nasalis muscle. Wide lateral undermining was then performed in both the subcutaneous plane above the nasalis muscle, and in a submuscular plane just above periosteum. This allowed the formation of a free nasalis muscle axial pedicle (based on the angular artery) which was still attached to the actual cutaneous flap, increasing its mobility and vascular viability. Hemostasis was obtained with pinpoint electrocoagulation. The flap was mobilized into position and the pivotal anchor points positioned and stabilized with buried interrupted sutures. Subcutaneous and dermal tissues were closed in a multilayered fashion with sutures. Tissue redundancies were excised, and the epidermal edges were apposed without significant tension and sutured with sutures.

## 2022-12-23 NOTE — ED PROVIDER NOTE - NS ED ROS FT
Respiratory:  No cough or respiratory distress. No hemoptysis. No history of asthma or RAD.  GI:  No nausea, vomiting, diarrhea or abdominal pain.  :  + dysuria + hematuria + frequency  MS:  No myalgia, muscle weakness, joint pain or back pain.  Neuro:  No headache or weakness.  No LOC.  Skin:  No skin rash.   Endocrine: No history of thyroid disease or diabetes.  Except as documented in the HPI,  all other systems are negative.

## 2022-12-23 NOTE — ED PROVIDER NOTE - PHYSICAL EXAMINATION
VITAL SIGNS: I have reviewed nursing notes and confirm.  CONSTITUTIONAL: Well-developed; well-nourished; in no acute distress.   SKIN:  skin exam is warm and dry, no acute rash.    HEAD: Normocephalic; atraumatic.  EYES:  conjunctiva and sclera clear.  ABD: Normal bowel sounds; soft; non-distended; non-tender  EXT: Normal ROM.  No clubbing, cyanosis or edema.   NEURO: Alert, oriented, grossly unremarkable

## 2022-12-23 NOTE — ED PROVIDER NOTE - ATTENDING SHARED VISIT SELECTORS
Critical Care Note      Cardiac surgery was called for the evaluation and management of: NYHA class IV A/C systolic heart failure, inotrope dependence, right ventricular dysfunction      The critical nature of the patient's condition includes the following: The patient is at imminent risk of death from NYHA class IV A/C systolic heart failure, inotrope dependence, and right ventricular dysfunction. [facility] is at imminent risk of needing escalation of MCS and right sided MCS     Critical care diagnoses that are being treated:  NYHA class IV A/C systolic heart failure / inotrope dependence  Right ventricular dysfunction      HPI: Patient is a 67 yo with severe NYHA class IV A/C systolic heart failure, inotrope dependence, and right ventricular dysfunction.  The patient is at imminent risk of death from NYHA class IV A/C systolic heart failure, inotrope dependence, and right ventricular dysfunction. [facility] is at imminent risk of needing escalation of MCS and right sided MCS.  Asked to evaluate for permanent LVAD     NYHA class IV A/C systolic heart failure / Cardiogenic shock   Continues on Dobutamine  Weaned off Milrinone   NT pro-BNP 4K  Pulmonary edema on CXR  CVP 10-15, PA 30/20's  Cardiac index 2's  MAPs 70-80s  Low flows this am likely from HTN   Discussed with HF team      Right ventricular dysfunction   Continues on dual inotropes  LFTs 100's  Likely some hepatic congestion    Addendum:  Reviewed TTE  LV looks reasonable size  RV moving well  Severe TR - may get better when line come out        Intake/Output Summary (Last 24 hours) at 4/13/2022 1258  Last data filed at 4/13/2022 1200  Gross per 24 hour   Intake 887.6 ml   Output 2393 ml   Net -1505.4 ml      Visit Vitals  /78 (BP 1 Location: Right upper arm, BP Patient Position: At rest)   Pulse 73   Temp 98.7 °F (37.1 °C)   Resp 24   Ht 6' 1\" (1.854 m)   Wt 191 lb 2.2 oz (86.7 kg)   SpO2 97%   BMI 25.22 kg/m²      CXR Results  (Last 48 hours) 04/13/22 0451  XR CHEST PORT Final result    Impression:  Slight improvement in minimal edema. Otherwise no change. Narrative: Indication: Heart failure, LVAD       Comparison to 4/12/2022. Portable exam obtained at 429 demonstrates slight   improvement in the minimal edema compared to the prior exam. LVAD and   right-sided chest tube are unchanged in position. 04/12/22 0428  XR CHEST PORT Final result    Impression:  No significant change. Narrative:  INDICATION:  Heart failure. LVAD. EXAM: CXR Portable. FINDINGS: Portable chest shows satisfactory support lines/devices without   significant change since yesterday. There is no apparent pneumothorax. Lungs   show no acute findings. Heart size is stable. There is no overt pulmonary edema. LVAD   Pump Speed (RPM): 4850  Pump Flow (LPM): 3.1  MAP: 100 (R brachial doppler)  PI (Pulsitility Index): 8.2  Power: 3.2   Test: No  Back Up  at Bedside & Labeled: Yes  Power Module Test: No  Driveline Site Care: No  Driveline Dressing: Clean, Dry, and Intact  MAP in Therapeutic Range (Outpatient): Yes  Testing  Alarms Reviewed: Yes  Back up SC speed: 4800  Back up Low Speed Limit: 4400  Emergency Equipment with Patient?: Yes  Emergency procedures reviewed?: Yes  Drive line site inspected?: No  Drive line intergrity inspected?: No  Drive line dressing changed?: No      Total critical care time - 75 minutes (CPT 21259, [id number])      I personally spent the above critical care time. This is time spent at this critically ill patient's bedside / unit / floor actively involved in patient care as well as the coordination of care. This does not include any procedural time which has been billed separately. This does not include any NP/PA patient care time.       I had a face to face encounter with the patient, reviewed and interpreted patient data including clinical events, labs, images, vital signs, I/O's, and examined patient. I have discussed the case and the plan and management of the patient's care with the consulting services and bedside nurses. This patient has a high probability of imminent, clinically significant deterioration, which requires the highest level of preparedness to intervene urgently. I participated in the decision-making and personally managed or directed the management of life and organ supporting interventions to treat or prevent imminent deterioration. This patient has a critical illness or injury that is acutely impairing one or more vital organ systems such that there is a high probability of imminent or life threatening deterioration in the patient's condition. This patient requires high complexity medical decision making to assess, manipulate, and support vital organ system failure. After stabilization, this patient still requires management to prevent further life / organ threatening deterioration. Exam/Medical Decision Making

## 2022-12-23 NOTE — ED PROVIDER NOTE - ATTENDING APP SHARED VISIT CONTRIBUTION OF CARE
Patient complains of dysuria, frequency, urgency.  She has had similar symptoms in the past related to urinary tract infection.  She denies fever, chills.  Vital signs noted.  NAD.  No CVA tenderness.  No suprapubic tenderness.  Urine sent.  Results are consistent with urinary tract infection.  Empiric oral antibiotics ordered.

## 2022-12-23 NOTE — ED PROVIDER NOTE - PATIENT PORTAL LINK FT
You can access the FollowMyHealth Patient Portal offered by [facility] by registering at the following website: [email or web address]. By joining [facility]’s FollowMyHealth portal, you will also be able to view your health information using other applications (apps) compatible with our system.

## 2022-12-24 LAB
CULTURE RESULTS: SIGNIFICANT CHANGE UP
SPECIMEN SOURCE: SIGNIFICANT CHANGE UP

## 2023-03-23 ENCOUNTER — APPOINTMENT (OUTPATIENT)
Dept: CARDIOLOGY | Facility: CLINIC | Age: 73
End: 2023-03-23
Payer: MEDICARE

## 2023-03-23 VITALS
BODY MASS INDEX: 22.49 KG/M2 | WEIGHT: 131 LBS | HEART RATE: 67 BPM | DIASTOLIC BLOOD PRESSURE: 78 MMHG | SYSTOLIC BLOOD PRESSURE: 136 MMHG

## 2023-03-23 PROCEDURE — 99214 OFFICE O/P EST MOD 30 MIN: CPT

## 2023-03-23 PROCEDURE — 93000 ELECTROCARDIOGRAM COMPLETE: CPT

## 2023-04-17 ENCOUNTER — APPOINTMENT (OUTPATIENT)
Dept: CARDIOLOGY | Facility: CLINIC | Age: 73
End: 2023-04-17

## 2023-04-27 ENCOUNTER — APPOINTMENT (OUTPATIENT)
Dept: CARDIOLOGY | Facility: CLINIC | Age: 73
End: 2023-04-27

## 2023-05-18 ENCOUNTER — APPOINTMENT (OUTPATIENT)
Dept: CARDIOLOGY | Facility: CLINIC | Age: 73
End: 2023-05-18
Payer: MEDICARE

## 2023-05-18 PROCEDURE — 93015 CV STRESS TEST SUPVJ I&R: CPT

## 2023-05-18 PROCEDURE — 93306 TTE W/DOPPLER COMPLETE: CPT

## 2023-08-22 ENCOUNTER — APPOINTMENT (OUTPATIENT)
Dept: BREAST CENTER | Facility: CLINIC | Age: 73
End: 2023-08-22
Payer: MEDICARE

## 2023-08-22 VITALS — HEART RATE: 76 BPM | HEIGHT: 64 IN | BODY MASS INDEX: 21.34 KG/M2 | WEIGHT: 125 LBS

## 2023-08-22 PROCEDURE — 99214 OFFICE O/P EST MOD 30 MIN: CPT

## 2023-08-22 NOTE — REASON FOR VISIT
[Initial Evaluation] : an initial evaluation [FreeTextEntry1] : h/o Right breast DCIS; reestablish care.

## 2023-08-22 NOTE — PHYSICAL EXAM
[Normocephalic] : normocephalic [EOMI] : extra ocular movement intact [No Supraclavicular Adenopathy] : no supraclavicular adenopathy [No Cervical Adenopathy] : no cervical adenopathy [No dominant masses] : no dominant masses in right breast  [No dominant masses] : no dominant masses left breast [No Nipple Retraction] : no left nipple retraction [No Nipple Discharge] : no left nipple discharge [No Axillary Lymphadenopathy] : no left axillary lymphadenopathy [No Rashes] : no rashes [No Ulceration] : no ulceration [de-identified] : Hx of radiaition [de-identified] : Nl respirations [de-identified] : Stable post surgical changes

## 2023-08-22 NOTE — ASSESSMENT
[FreeTextEntry1] : Patient is a 73F with history of right breast DCIS (+/+), s/p WLE in 10/2016.  She underwent RT and was completed anastrozole x 5 years (Dr. Corrales).  Of note, she was diagnosed with head and neck cancer in 2020.  She most recently had bilateral scg mmg/us in 2/2023 that showed right post surgical changes and stable calcifications and fat necrosis (BIRADS 2), with annual follow up recommended.  We discussed her cancer history and treatment in detail. She follows up with medical oncology at [facility].  We discussed breast density. Increasing breast density has been found to increase ones risk of breast cancer, but at this time, there is no clear indication for additional imaging in this setting, as both US and MRI have not been found to improve survival. One can consider bilateral screening US. However, out of 1000 women screened, the use of routine US will only identify an additional 3-5 cancers. The use of US was found to increase the likelihood of undergoing more imaging and more biopsies. She does not have dense breasts on most recent imaging but wishes to continue with screening ultrasounds at this time  All questions and concerns were answered in detail.  Patient is for bilateral mmg and US in 2/2204.  She is to follow up after imaging, pending any interval changes.  She is to follow up with medical oncology as scheduled.  Total time spent on encounter was greater than 30 minutes, which included face to face time with the patient, performing an exam, reviewing previous medical records including imaging/ pathology, documenting in patient record and coordinating care/imaging. Greater than 50% of the encounter was spent on counseling and coordination of her breast issue.

## 2023-08-22 NOTE — HISTORY OF PRESENT ILLNESS
[FreeTextEntry1] : Patient is a 73F with history of right breast DCIS (+/+) s/p right WLE on 10/25/2016  S/p RT Arimidex x 5 years (under Dr. Corrales)  Of note, she was diagnosed with head and neck cancer in .  Her history is as follows: 1. R DCIS on MRI 2. s/p Right NLOC - 10/25/16. 3. h/o Right ADH/ALH  4. s/p PBI external beam - completed 2016. 5. Dr. Clark - Bereketmidex 6. s/p Right breast MRI guided bx - benign concordant - 2018. 7. She was diagnosed with head and neck cancer in 2020 and has been undergoing treatment. 8. She is s/p excision of a sebaceous cyst left breast/axilla on 11/10/2020.   Most recent imagin23: B/L Screening Mammo & Sono - BIRADS 2 Scattered density R post surgical and radiation changes, Scattered arras of fat necrosis and stable punctate calcifications. Ultrasound shows right post surgical distortion. Recommend yearly screening mammograms.  She denies any current complaints. No new changes to her breasts.

## 2023-08-22 NOTE — DATA REVIEWED
[FreeTextEntry1] : B/L Screening Mammo & Sono - 02/21/2023: There are scattered areas of fibroglandular densities in both breasts. FINDINGS: There are stable postsurgical and radiation changes to the right breast with surgical clips in the posterior upper outer right breast, biopsy clip in the upper outer right breast at middle depth, and biopsy marker in the anterior central right breast. Scattered areas of calcified fat necrosis and punctate calcifications are stable. The fibroglandular tissue pattern is stable. Bilateral breast ultrasound demonstrates mild postsurgical architectural distortion the right breast at the operative site.  Breast parenchyma is otherwise normal in appearance. There are no suspicious mases, microcalcifications, or architectural distortion. IMPRESSION: Stable postsurgical changes in the right breast. No suspicious mammographic findings. Recommend yearly screening mammograms. BI-RADS: Category 2: Benign RECOMMENDATION: Routine screening mammography.

## 2023-09-13 NOTE — ED PROVIDER NOTE - IV ALTEPLASE DOOR HIDDEN
show Suturegard Intro: Intraoperative tissue expansion was performed, utilizing the SUTUREGARD device, in order to reduce wound tension.

## 2023-09-14 ENCOUNTER — APPOINTMENT (OUTPATIENT)
Dept: CARDIOLOGY | Facility: CLINIC | Age: 73
End: 2023-09-14
Payer: MEDICARE

## 2023-09-14 VITALS
BODY MASS INDEX: 21.51 KG/M2 | SYSTOLIC BLOOD PRESSURE: 132 MMHG | WEIGHT: 126 LBS | HEART RATE: 73 BPM | DIASTOLIC BLOOD PRESSURE: 84 MMHG | HEIGHT: 64 IN

## 2023-09-14 DIAGNOSIS — R06.02 SHORTNESS OF BREATH: ICD-10-CM

## 2023-09-14 PROCEDURE — 93000 ELECTROCARDIOGRAM COMPLETE: CPT

## 2023-09-14 PROCEDURE — 99214 OFFICE O/P EST MOD 30 MIN: CPT

## 2023-09-14 RX ORDER — FAMOTIDINE 20 MG/1
20 TABLET, FILM COATED ORAL
Refills: 0 | Status: ACTIVE | COMMUNITY

## 2023-09-14 RX ORDER — QUINAPRIL HYDROCHLORIDE 40 MG/1
40 TABLET, FILM COATED ORAL DAILY
Refills: 0 | Status: DISCONTINUED | COMMUNITY
End: 2023-09-14

## 2023-09-14 RX ORDER — ENALAPRIL MALEATE 20 MG/1
20 TABLET ORAL TWICE DAILY
Refills: 0 | Status: ACTIVE | COMMUNITY

## 2023-10-17 ENCOUNTER — APPOINTMENT (OUTPATIENT)
Dept: UROLOGY | Facility: CLINIC | Age: 73
End: 2023-10-17
Payer: MEDICARE

## 2023-10-17 VITALS
DIASTOLIC BLOOD PRESSURE: 82 MMHG | SYSTOLIC BLOOD PRESSURE: 149 MMHG | BODY MASS INDEX: 21.51 KG/M2 | WEIGHT: 126 LBS | HEIGHT: 64 IN | HEART RATE: 88 BPM

## 2023-10-17 DIAGNOSIS — K46.9 UNSPECIFIED ABDOMINAL HERNIA W/OUT OBSTRUCTION OR GANGRENE: ICD-10-CM

## 2023-10-17 DIAGNOSIS — Z86.39 PERSONAL HISTORY OF OTHER ENDOCRINE, NUTRITIONAL AND METABOLIC DISEASE: ICD-10-CM

## 2023-10-17 DIAGNOSIS — Z86.19 PERSONAL HISTORY OF OTHER INFECTIOUS AND PARASITIC DISEASES: ICD-10-CM

## 2023-10-17 DIAGNOSIS — Z85.819 PERSONAL HISTORY OF MALIGNANT NEOPLASM OF UNSPECIFIED SITE OF LIP, ORAL CAVITY, AND PHARYNX: ICD-10-CM

## 2023-10-17 DIAGNOSIS — Z87.19 PERSONAL HISTORY OF OTHER DISEASES OF THE DIGESTIVE SYSTEM: ICD-10-CM

## 2023-10-17 DIAGNOSIS — Z85.3 PERSONAL HISTORY OF MALIGNANT NEOPLASM OF BREAST: ICD-10-CM

## 2023-10-17 PROCEDURE — 99204 OFFICE O/P NEW MOD 45 MIN: CPT

## 2023-10-26 ENCOUNTER — NON-APPOINTMENT (OUTPATIENT)
Age: 73
End: 2023-10-26

## 2023-10-26 LAB
ANION GAP SERPL CALC-SCNC: 17 MMOL/L
BUN SERPL-MCNC: 22 MG/DL
CALCIUM SERPL-MCNC: 11 MG/DL
CHLORIDE SERPL-SCNC: 99 MMOL/L
CO2 SERPL-SCNC: 25 MMOL/L
CREAT SERPL-MCNC: 0.9 MG/DL
EGFR: 68 ML/MIN/1.73M2
GLUCOSE SERPL-MCNC: 115 MG/DL
POTASSIUM SERPL-SCNC: 4.4 MMOL/L
SODIUM SERPL-SCNC: 141 MMOL/L

## 2023-12-04 ENCOUNTER — APPOINTMENT (OUTPATIENT)
Dept: UROLOGY | Facility: CLINIC | Age: 73
End: 2023-12-04
Payer: MEDICARE

## 2023-12-04 VITALS
OXYGEN SATURATION: 98 % | RESPIRATION RATE: 18 BRPM | DIASTOLIC BLOOD PRESSURE: 69 MMHG | HEIGHT: 64 IN | SYSTOLIC BLOOD PRESSURE: 115 MMHG | HEART RATE: 89 BPM

## 2023-12-04 DIAGNOSIS — N28.89 OTHER SPECIFIED DISORDERS OF KIDNEY AND URETER: ICD-10-CM

## 2023-12-04 PROCEDURE — 99215 OFFICE O/P EST HI 40 MIN: CPT

## 2024-01-22 ENCOUNTER — APPOINTMENT (OUTPATIENT)
Dept: ENDOCRINOLOGY | Facility: CLINIC | Age: 74
End: 2024-01-22
Payer: MEDICARE

## 2024-01-22 VITALS
SYSTOLIC BLOOD PRESSURE: 122 MMHG | OXYGEN SATURATION: 98 % | HEIGHT: 64 IN | WEIGHT: 123 LBS | BODY MASS INDEX: 21 KG/M2 | HEART RATE: 85 BPM | DIASTOLIC BLOOD PRESSURE: 80 MMHG

## 2024-01-22 PROCEDURE — 99204 OFFICE O/P NEW MOD 45 MIN: CPT

## 2024-01-22 NOTE — ASSESSMENT
[FreeTextEntry1] : 73 year old lady with Hc of breast cancer, laryngeal cancer s/p chemo and radiation who present for evaluation of thyroid nodules and osteopenia    # thyroid nodules  - noted on thyroid US right subcm and left 9.6 mm TR4  - has dysphagia and neck tightness given radiation , brother with nodules also no ca  - TSh ok  - check thyroid US if stable will monitor yearly , discussed indication for FNA   # osteopenia  -DXA scan 7/2023 with hip osteopenia, spine ok has multiple back problems and obvious kyphosis  vit D level high , she will hold for now , monitor DXA scan ( 7/2025)

## 2024-01-22 NOTE — PHYSICAL EXAM
[Alert] : alert [No Lid Lag] : no lid lag [No Proptosis] : no proptosis [No Respiratory Distress] : no respiratory distress [No Accessory Muscle Use] : no accessory muscle use [Clear to Auscultation] : lungs were clear to auscultation bilaterally [Normal S1, S2] : normal S1 and S2 [No Murmurs] : no murmurs [Regular Rhythm] : with a regular rhythm [No Edema] : no peripheral edema [No Stigmata of Cushings Syndrome] : no stigmata of Cushings Syndrome [No Tremors] : no tremors [Oriented x3] : oriented to person, place, and time [de-identified] : tight skin , unable to feel thyroid

## 2024-01-22 NOTE — HISTORY OF PRESENT ILLNESS
[FreeTextEntry1] : 73 year old lady with Hc of breast cancer, laryngeal cancer s/p chemo and radiation who present for evaluation of thyroid nodules and osteopenia    # thyroid nodules  - noted on thyroid US right subcm and left 9.6 mm TR4  - has dysphagia and neck tightness given radiation , brother with nodules also no ca  - TSh ok   # osteopenia  -DXA scan 7/2023 with hip osteopenia, spine ok has multiple back problems and obvious kyphosis  vit D level high , she will hold for now

## 2024-01-22 NOTE — DATA REVIEWED
[FreeTextEntry1] : 11/2023: TSh ok , vit D high    thyroid US (11/2022 )  regional website rviewed images : right subcm and left 9.6 mm TR4   DXA scan ( 7/2023 ) regional website

## 2024-02-07 ENCOUNTER — NON-APPOINTMENT (OUTPATIENT)
Age: 74
End: 2024-02-07

## 2024-02-21 ENCOUNTER — APPOINTMENT (OUTPATIENT)
Dept: ORTHOPEDIC SURGERY | Facility: CLINIC | Age: 74
End: 2024-02-21
Payer: MEDICARE

## 2024-02-21 VITALS — HEIGHT: 64 IN | WEIGHT: 125 LBS | BODY MASS INDEX: 21.34 KG/M2

## 2024-02-21 DIAGNOSIS — M50.90 CERVICAL DISC DISORDER, UNSPECIFIED, UNSPECIFIED CERVICAL REGION: ICD-10-CM

## 2024-02-21 DIAGNOSIS — M40.202 UNSPECIFIED KYPHOSIS, CERVICAL REGION: ICD-10-CM

## 2024-02-21 DIAGNOSIS — M51.9 UNSPECIFIED THORACIC, THORACOLUMBAR AND LUMBOSACRAL INTERVERTEBRAL DISC DISORDER: ICD-10-CM

## 2024-02-21 PROCEDURE — 99203 OFFICE O/P NEW LOW 30 MIN: CPT

## 2024-02-22 ENCOUNTER — RESULT REVIEW (OUTPATIENT)
Age: 74
End: 2024-02-22

## 2024-02-22 ENCOUNTER — OUTPATIENT (OUTPATIENT)
Dept: OUTPATIENT SERVICES | Facility: HOSPITAL | Age: 74
LOS: 1 days | End: 2024-02-22
Payer: MEDICARE

## 2024-02-22 DIAGNOSIS — R92.2 INCONCLUSIVE MAMMOGRAM: ICD-10-CM

## 2024-02-22 DIAGNOSIS — Z12.31 ENCOUNTER FOR SCREENING MAMMOGRAM FOR MALIGNANT NEOPLASM OF BREAST: ICD-10-CM

## 2024-02-22 PROBLEM — M40.202 CERVICAL KYPHOSIS: Status: ACTIVE | Noted: 2024-02-22

## 2024-02-22 PROBLEM — M50.90 CERVICAL DISC DISEASE: Status: ACTIVE | Noted: 2024-02-22

## 2024-02-22 PROBLEM — M51.9 LUMBAR DISC DISEASE: Status: ACTIVE | Noted: 2024-02-22

## 2024-02-22 PROCEDURE — 76641 ULTRASOUND BREAST COMPLETE: CPT | Mod: 26,50

## 2024-02-22 PROCEDURE — 77063 BREAST TOMOSYNTHESIS BI: CPT | Mod: 26

## 2024-02-22 PROCEDURE — 77067 SCR MAMMO BI INCL CAD: CPT

## 2024-02-22 PROCEDURE — 77067 SCR MAMMO BI INCL CAD: CPT | Mod: 26

## 2024-02-22 PROCEDURE — 76641 ULTRASOUND BREAST COMPLETE: CPT | Mod: 50

## 2024-02-22 PROCEDURE — 77063 BREAST TOMOSYNTHESIS BI: CPT

## 2024-02-22 NOTE — HISTORY OF PRESENT ILLNESS
[de-identified] : This is a pleasant 73-year-old woman here for an neck and back complaints feels like she is lost a few inches history of laryngeal cancer treated with radiation and chemo 4 years ago does have some scar tissue in the front prior to that she had had emergency tracheostomy for a few months she has been doing some therapy with [facility] for back and neck she is takes [facility] Dr. Luis Gandara does not smoke no drug allergies does not take anti-inflammatories also history of breast cancer hypertension and GERD

## 2024-02-22 NOTE — ASSESSMENT
[FreeTextEntry1] : can continue PT  some of kyphosis may be soft tissue post RT scarring f/u with endocrine about management of osteopenia f/u prn no orthopedic intervention if back pain worsens  consider PM

## 2024-02-22 NOTE — IMAGING
[de-identified] : Pleasant easy to examine no distress obvious cervical kyphosis with some limitations in flexion extension noted cannot come up to neutral limits in rotation as well upper extremities no focal neurologic deficits  Lumbar spine mild spasm mild limits in motion normal gait depressed but symmetric reflexes in both lower extremities Diagnostics  Bone density 7/12/2023   osteopenia  X-rays cervical thoracic and lumbar spine 8/4/2023 multiple DDD  X-rays lumbar spine 9/1/2022 marked DDD  MRIs 8/17/2023 cervical spine degenerative changes   8/18/2023 thoracic spine  HNP T 4-5  MRI lumbar spine 9/1/2023  moderate multiple level changes  Cervical spine CT scan and 521: Left-sided facet arthritis C6-7 ridging spondylitic changes left C7-T1 ridging at T2-3

## 2024-02-23 DIAGNOSIS — R92.2 INCONCLUSIVE MAMMOGRAM: ICD-10-CM

## 2024-02-23 DIAGNOSIS — Z12.31 ENCOUNTER FOR SCREENING MAMMOGRAM FOR MALIGNANT NEOPLASM OF BREAST: ICD-10-CM

## 2024-03-01 ENCOUNTER — APPOINTMENT (OUTPATIENT)
Dept: BREAST CENTER | Facility: CLINIC | Age: 74
End: 2024-03-01
Payer: MEDICARE

## 2024-03-01 VITALS
WEIGHT: 125 LBS | HEART RATE: 80 BPM | SYSTOLIC BLOOD PRESSURE: 145 MMHG | DIASTOLIC BLOOD PRESSURE: 78 MMHG | BODY MASS INDEX: 21.34 KG/M2 | HEIGHT: 64 IN

## 2024-03-01 DIAGNOSIS — Z98.890 OTHER SPECIFIED POSTPROCEDURAL STATES: ICD-10-CM

## 2024-03-01 DIAGNOSIS — D05.11 INTRADUCTAL CARCINOMA IN SITU OF RIGHT BREAST: ICD-10-CM

## 2024-03-01 PROCEDURE — 99214 OFFICE O/P EST MOD 30 MIN: CPT

## 2024-03-01 NOTE — HISTORY OF PRESENT ILLNESS
[FreeTextEntry1] : Patient is a 73F with history of right breast DCIS (+/+) s/p right WLE on 10/25/2016  S/p RT Arimidex x 5 years (under Dr. Corrales)  Of note, she was diagnosed with head and neck cancer in 2020.  Her history is as follows: 1. R DCIS on MRI 2. s/p Right NLOC - 10/25/16. 3. h/o Right ADH/ALH  4. s/p PBI external beam - completed 12/2016. 5. Dr. Clark - Arimidex 6. s/p Right breast MRI guided bx - benign concordant - March 2018. 7. She was diagnosed with head and neck cancer in February 2020 and has been undergoing treatment. 8. She is s/p excision of a sebaceous cyst left breast/axilla on 11/10/2020.   She has no breast related complaints at this time.  She denies any breast pain, has not palpated any new masses in either breast and denies any nipple discharge or retraction.  Most recent imaging: B/L Screening Mammo - 02/22/2024: -There are scattered areas of fibroglandular density. -There is an area of architectural distortion at the site of lumpectomy seen in the right breast. -There are stable calcifications seen in the right breast. -No suspicious mass, grouping of calcifications, or other abnormality is identified. -There is no mammographic evidence of malignancy. BI-RADS Category 2:  Benign  B/L Breast Sono - 02/22/2024: -No suspicious solid or cystic masses.  -Benign scar at the lumpectomy site in the lateral right breast.  -No axillary adenopathy. -No sonographic evidence of malignancy. BI-RADS Category 1: Negative  She presents today for evaluation and imaging review.

## 2024-03-01 NOTE — DATA REVIEWED
[FreeTextEntry1] : B/L Screening Mammo - 02/22/2024: MAMMOGRAM FINDINGS: Mammography was performed including the following views: bilateral craniocaudal with tomosynthesis, bilateral mediolateral oblique with tomosynthesis.  The examination includes digital synthetic 2D and digital tomosynthesis 3D images. Additional imaging analysis was performed using CAD (computer-aided detection) software.  There are scattered areas of fibroglandular density.  Finding 1:  There is an area of architectural distortion at the site of lumpectomy seen in the right breast.  Finding 2:  There are stable calcifications seen in the right breast.  No suspicious mass, grouping of calcifications, or other abnormality is identified.  IMPRESSION: There is no mammographic evidence of malignancy.  RECOMMENDATION: Unless otherwise indicated by clinical findings, annual screening mammography recommended.  ASSESSMENT: BI-RADS Category 2:  Benign   B/L Breast Sono - 02/22/2024: Findings:  Ultrasound:  Bilateral whole breast ultrasound was performed.  No suspicious solid or cystic masses. Benign scar at the lumpectomy site in the lateral right breast. No axillary adenopathy.  Impression: No sonographic evidence of malignancy.  Recommendation: Unless otherwise indicated by clinical findings, annual screening mammography recommended.  BI-RADS Category 1: Negative

## 2024-03-01 NOTE — PAST MEDICAL HISTORY
[Postmenopausal] : The patient is postmenopausal [Menarche Age ____] : age at menarche was [unfilled] [Menopause Age____] : age at menopause was [unfilled] [History of Hormone Replacement Treatment] : has no history of hormone replacement treatment [Total Preg ___] : G[unfilled] [FreeTextEntry6] : No. [FreeTextEntry7] : Yes.

## 2024-03-01 NOTE — ASSESSMENT
[FreeTextEntry1] : Patient is a 73F with history of right breast DCIS (+/+), s/p WLE in 10/2016.  She underwent RT and was completed anastrozole x 5 years (Dr. Corrales).  Of note, she was diagnosed with head and neck cancer in 2020.   Most recent imaging: B/L Screening Mammo - 02/22/2024: -There are scattered areas of fibroglandular density. -There is an area of architectural distortion at the site of lumpectomy seen in the right breast. -There are stable calcifications seen in the right breast. -No suspicious mass, grouping of calcifications, or other abnormality is identified. -There is no mammographic evidence of malignancy. BI-RADS Category 2:  Benign  B/L Breast Sono - 02/22/2024: -No suspicious solid or cystic masses.  -Benign scar at the lumpectomy site in the lateral right breast.  -No axillary adenopathy. -No sonographic evidence of malignancy. BI-RADS Category 1: Negative  We discussed her cancer history and treatment in detail. She follows up with medical oncology at [facility].  We discussed breast density. Increasing breast density has been found to increase ones risk of breast cancer, but at this time, there is no clear indication for additional imaging in this setting, as both US and MRI have not been found to improve survival. One can consider bilateral screening US. However, out of 1000 women screened, the use of routine US will only identify an additional 3-5 cancers. The use of US was found to increase the likelihood of undergoing more imaging and more biopsies. She does not have dense breasts on most recent imaging but wishes to continue with screening ultrasounds at this time  All questions and concerns were answered in detail.  Patient is for B/L Screening Mammo & Sono in February 2025.  She is to follow up after imaging, pending any interval changes.  She is to follow up with medical oncology as scheduled.  I spent a total of 30 minutes of face to face time with this patient, greater than 50% of which was spent in counseling and/or coordination of care. All of her questions were appropriately answered. She knows to call with any concerns.

## 2024-03-01 NOTE — REVIEW OF SYSTEMS
[Breast Pain] : no breast pain [Breast Lump] : no breast lump [Nipple Inverted] : no inversion of the nipple [Nipple Discharge] : no nipple discharge [Negative] : Constitutional

## 2024-03-01 NOTE — PHYSICAL EXAM
[Normocephalic] : normocephalic [Atraumatic] : atraumatic [No Supraclavicular Adenopathy] : no supraclavicular adenopathy [No dominant masses] : no dominant masses in right breast  [No dominant masses] : no dominant masses left breast [No Nipple Discharge] : no right nipple discharge [Breast Nipple Inversion] : nipples not inverted [Breast Nipple Retraction] : nipples not retracted [No Rashes] : no rashes [de-identified] : bilateral scattered areas of fibroglandular densities. \par  well healed right surgical scar with palpable scar tissue. [No Ulceration] : no ulceration [de-identified] : No axillary lymphadenopathy appreciated. [de-identified] : No axillary lymphadenopathy appreciated.

## 2024-03-12 ENCOUNTER — APPOINTMENT (OUTPATIENT)
Dept: CARDIOLOGY | Facility: CLINIC | Age: 74
End: 2024-03-12
Payer: MEDICARE

## 2024-03-12 VITALS
DIASTOLIC BLOOD PRESSURE: 68 MMHG | HEART RATE: 65 BPM | HEIGHT: 64 IN | BODY MASS INDEX: 21.34 KG/M2 | SYSTOLIC BLOOD PRESSURE: 122 MMHG | WEIGHT: 125 LBS

## 2024-03-12 DIAGNOSIS — R07.9 CHEST PAIN, UNSPECIFIED: ICD-10-CM

## 2024-03-12 DIAGNOSIS — I10 ESSENTIAL (PRIMARY) HYPERTENSION: ICD-10-CM

## 2024-03-12 PROCEDURE — 93000 ELECTROCARDIOGRAM COMPLETE: CPT

## 2024-03-12 PROCEDURE — 99214 OFFICE O/P EST MOD 30 MIN: CPT

## 2024-03-12 NOTE — CARDIOLOGY SUMMARY
[___] : [unfilled] [No Ischemia] : no Ischemia [LVEF ___%] : LVEF [unfilled]% [de-identified] : CONCLUSIONS:  1. The left ventricular systolic function is normal with an ejection fraction visually estimated at 60-65 %. 2. There is mild (grade 1) left ventricular diastolic dysfunction with normal left atrial filling pressure. 3. Mild mitral regurgitation. 4. Compared to the transthoracic echocardiogram performed on 10/19/2020, there have been no significant interval changes.

## 2024-03-12 NOTE — HISTORY OF PRESENT ILLNESS
[FreeTextEntry1] : Justina is a 73-year-old white female, under my care for the past 4 years.   The patient presented to this office with complaints of chest discomfort and  cardiovascular screening.  Her workup was essentially negative in terms of ischemic heart disease.  She underwent a stress test which was normal and an echo which was also done at the same time which basically was normal.  The patient does have mild hypertension for which he takes Accupril and hydrochlorothiazide.  The patient does complain of chest pain. Atypical for heart and coronary disease. No symptoms of congestive heart failure. No new cardiac complaints.  The patient is here for routine followup care. sill c/om chest pain- atypical  NO SOB uneventful 3 mos ros is negative  stress test was negative echo is normal no tia, cva or pvd  ros is essentially unchanged except for cancer of the larynx  stress test is negative in 2023 echo was normal in 2023

## 2024-03-12 NOTE — PHYSICAL EXAM
[Well Developed] : well developed [No Acute Distress] : no acute distress [Well Nourished] : well nourished [Normal Venous Pressure] : normal venous pressure [No Carotid Bruit] : no carotid bruit [Normal S1, S2] : normal S1, S2 [No Rub] : no rub [No Murmur] : no murmur [No Gallop] : no gallop [Good Air Entry] : good air entry [Clear Lung Fields] : clear lung fields [Soft] : abdomen soft [No Respiratory Distress] : no respiratory distress  [Non Tender] : non-tender [No Masses/organomegaly] : no masses/organomegaly [Normal Bowel Sounds] : normal bowel sounds [Normal Gait] : normal gait [No Edema] : no edema [No Cyanosis] : no cyanosis [No Clubbing] : no clubbing [No Varicosities] : no varicosities [No Rash] : no rash [No Skin Lesions] : no skin lesions [Moves all extremities] : moves all extremities [No Focal Deficits] : no focal deficits [Normal Speech] : normal speech [Alert and Oriented] : alert and oriented [Normal memory] : normal memory [General Appearance - Well Developed] : well developed [Well Groomed] : well groomed [Normal Appearance] : normal appearance [General Appearance - Well Nourished] : well nourished [No Deformities] : no deformities [General Appearance - In No Acute Distress] : no acute distress [Normal Conjunctiva] : the conjunctiva exhibited no abnormalities [Eyelids - No Xanthelasma] : the eyelids demonstrated no xanthelasmas [Normal Oral Mucosa] : normal oral mucosa [No Oral Pallor] : no oral pallor [No Oral Cyanosis] : no oral cyanosis [Normal Jugular Venous A Waves Present] : normal jugular venous A waves present [No Jugular Venous Brown A Waves] : no jugular venous brown A waves [Normal Jugular Venous V Waves Present] : normal jugular venous V waves present [Respiration, Rhythm And Depth] : normal respiratory rhythm and effort [Exaggerated Use Of Accessory Muscles For Inspiration] : no accessory muscle use [Auscultation Breath Sounds / Voice Sounds] : lungs were clear to auscultation bilaterally [Heart Sounds] : normal S1 and S2 [Heart Rate And Rhythm] : heart rate and rhythm were normal [Murmurs] : no murmurs present [Abdomen Soft] : soft [Abdomen Tenderness] : non-tender [Abnormal Walk] : normal gait [Abdomen Mass (___ Cm)] : no abdominal mass palpated [Gait - Sufficient For Exercise Testing] : the gait was sufficient for exercise testing [Nail Clubbing] : no clubbing of the fingernails [Cyanosis, Localized] : no localized cyanosis [Petechial Hemorrhages (___cm)] : no petechial hemorrhages [Skin Color & Pigmentation] : normal skin color and pigmentation [No Venous Stasis] : no venous stasis [] : no rash [Skin Lesions] : no skin lesions [No Skin Ulcers] : no skin ulcer [No Xanthoma] : no  xanthoma was observed [Oriented To Time, Place, And Person] : oriented to person, place, and time [Affect] : the affect was normal [Mood] : the mood was normal [No Anxiety] : not feeling anxious

## 2024-03-12 NOTE — CARDIOLOGY SUMMARY
[de-identified] : CONCLUSIONS:  1. The left ventricular systolic function is normal with an ejection fraction visually estimated at 60-65 %. 2. There is mild (grade 1) left ventricular diastolic dysfunction with normal left atrial filling pressure. 3. Mild mitral regurgitation. 4. Compared to the transthoracic echocardiogram performed on 10/19/2020, there have been no significant interval changes.

## 2024-05-14 ENCOUNTER — NON-APPOINTMENT (OUTPATIENT)
Age: 74
End: 2024-05-14

## 2024-05-21 ENCOUNTER — APPOINTMENT (OUTPATIENT)
Dept: ENDOCRINOLOGY | Facility: CLINIC | Age: 74
End: 2024-05-21
Payer: MEDICARE

## 2024-05-21 VITALS
OXYGEN SATURATION: 99 % | TEMPERATURE: 97.5 F | SYSTOLIC BLOOD PRESSURE: 115 MMHG | BODY MASS INDEX: 21.34 KG/M2 | HEART RATE: 84 BPM | HEIGHT: 64 IN | DIASTOLIC BLOOD PRESSURE: 70 MMHG | WEIGHT: 125 LBS

## 2024-05-21 DIAGNOSIS — M85.80 OTHER SPECIFIED DISORDERS OF BONE DENSITY AND STRUCTURE, UNSPECIFIED SITE: ICD-10-CM

## 2024-05-21 DIAGNOSIS — E04.2 NONTOXIC MULTINODULAR GOITER: ICD-10-CM

## 2024-05-21 PROCEDURE — 99214 OFFICE O/P EST MOD 30 MIN: CPT

## 2024-05-21 NOTE — REASON FOR VISIT
[Follow - Up] : a follow-up visit [Thyroid nodule/ MNG] : thyroid nodule/ MNG [FreeTextEntry2] : Mr Zurdo Wife ( my patient)

## 2024-05-21 NOTE — PHYSICAL EXAM
[Alert] : alert [No Proptosis] : no proptosis [No Lid Lag] : no lid lag [No Respiratory Distress] : no respiratory distress [No Accessory Muscle Use] : no accessory muscle use [Clear to Auscultation] : lungs were clear to auscultation bilaterally [Normal S1, S2] : normal S1 and S2 [No Murmurs] : no murmurs [Regular Rhythm] : with a regular rhythm [No Edema] : no peripheral edema [No Stigmata of Cushings Syndrome] : no stigmata of Cushings Syndrome [No Tremors] : no tremors [Oriented x3] : oriented to person, place, and time [de-identified] : tight skin , unable to feel thyroid

## 2024-05-21 NOTE — HISTORY OF PRESENT ILLNESS
[FreeTextEntry1] : 73 year old lady with Hc of breast cancer, laryngeal cancer s/p chemo and radiation who present for evaluation of thyroid nodules and osteopenia    # thyroid nodules  - noted on thyroid US right subcm and left 9.6 mm TR4  - has dysphagia and neck tightness given radiation , brother with nodules also no ca  - 2/2024: -right subcm nodule stable  -left nodule 1 stable - new nodule seen 0.9 cm TR5 , discussed with patient monitoring Vs FNA given size is at limit for FNA for A Tr5 ( ISAAC high ) , she will discuss with her ENT Physician too and decide whether to FNA now or monitor with US in 6-12 months - TSh ok  - due for repeat US 8/2024   # osteopenia  -DXA scan 7/2023 with hip osteopenia, spine ok has multiple back problems and obvious kyphosis  vit D level high , previously  now back on VIt D with MVI

## 2024-05-21 NOTE — ASSESSMENT
[FreeTextEntry1] : 73 year old lady with Hc of breast cancer, laryngeal cancer s/p chemo and radiation who present for  follow up evaluation of thyroid nodules and osteopenia    # thyroid nodules  - noted on thyroid US right subcm and left 9.6 mm TR4  - has dysphagia and neck tightness given radiation , brother with nodules also no ca  - TSh ok  - 2/2024: -right subcm nodule stable -left nodule 1 stable - new nodule seen 0.9 cm TR5 , discussed with patient monitoring Vs FNA given size is at limit for FNA for A Tr5 ( ISAAC high ) , she will discuss with her ENT Physician too and decide whether to FNA now or monitor with US in 6-12 months - TSh ok - due for repeat US 8/2024   # osteopenia  -DXA scan 7/2023 with hip osteopenia, spine ok has multiple back problems and obvious kyphosis  vit D level was high , now on vit D with MVI ,  , monitor DXA scan ( 7/2025)

## 2024-05-21 NOTE — DATA REVIEWED
[FreeTextEntry1] : 11/2023: TSh ok , vit D high  3/2024 : ldl 123  glucose 104 crea  0.88  gfr 69 calcium 10.2  A1c 5.4% tsh 1.1  Ft 4 1.2     thyroid US (11/2022 )  regional website reviewed images : right subcm and left 9.6 mm TR4   thyroid US ( 2/2024)  -right subcm nodule stable -left nodule 1 stable - new nodule seen 0.9 cm TR5 ,    DXA scan ( 7/2023 ) regional website : Osteopenia of hip FRAX below range

## 2024-06-04 ENCOUNTER — APPOINTMENT (OUTPATIENT)
Dept: UROLOGY | Facility: CLINIC | Age: 74
End: 2024-06-04

## 2024-06-10 ENCOUNTER — NON-APPOINTMENT (OUTPATIENT)
Age: 74
End: 2024-06-10

## 2024-06-27 ENCOUNTER — NON-APPOINTMENT (OUTPATIENT)
Age: 74
End: 2024-06-27

## 2024-07-07 ENCOUNTER — NON-APPOINTMENT (OUTPATIENT)
Age: 74
End: 2024-07-07

## 2024-10-22 ENCOUNTER — APPOINTMENT (OUTPATIENT)
Dept: CARDIOLOGY | Facility: CLINIC | Age: 74
End: 2024-10-22
Payer: MEDICARE

## 2024-10-22 VITALS
HEART RATE: 79 BPM | BODY MASS INDEX: 21.34 KG/M2 | WEIGHT: 125 LBS | HEIGHT: 64 IN | SYSTOLIC BLOOD PRESSURE: 128 MMHG | DIASTOLIC BLOOD PRESSURE: 70 MMHG

## 2024-10-22 DIAGNOSIS — I10 ESSENTIAL (PRIMARY) HYPERTENSION: ICD-10-CM

## 2024-10-22 DIAGNOSIS — R07.9 CHEST PAIN, UNSPECIFIED: ICD-10-CM

## 2024-10-22 PROCEDURE — 93000 ELECTROCARDIOGRAM COMPLETE: CPT

## 2024-10-22 PROCEDURE — 99214 OFFICE O/P EST MOD 30 MIN: CPT

## 2024-10-22 RX ORDER — OXYBUTYNIN CHLORIDE 5 MG/1
5 TABLET ORAL
Refills: 0 | Status: ACTIVE | COMMUNITY

## 2024-10-23 ENCOUNTER — NON-APPOINTMENT (OUTPATIENT)
Age: 74
End: 2024-10-23

## 2024-11-13 ENCOUNTER — APPOINTMENT (OUTPATIENT)
Dept: ENDOCRINOLOGY | Facility: CLINIC | Age: 74
End: 2024-11-13
Payer: MEDICARE

## 2024-11-13 VITALS
HEART RATE: 74 BPM | SYSTOLIC BLOOD PRESSURE: 130 MMHG | RESPIRATION RATE: 18 BRPM | BODY MASS INDEX: 21 KG/M2 | OXYGEN SATURATION: 100 % | DIASTOLIC BLOOD PRESSURE: 70 MMHG | TEMPERATURE: 97.1 F | HEIGHT: 64 IN | WEIGHT: 123 LBS

## 2024-11-13 DIAGNOSIS — M85.80 OTHER SPECIFIED DISORDERS OF BONE DENSITY AND STRUCTURE, UNSPECIFIED SITE: ICD-10-CM

## 2024-11-13 DIAGNOSIS — E04.2 NONTOXIC MULTINODULAR GOITER: ICD-10-CM

## 2024-11-13 PROCEDURE — G2211 COMPLEX E/M VISIT ADD ON: CPT

## 2024-11-13 PROCEDURE — 99213 OFFICE O/P EST LOW 20 MIN: CPT

## 2024-11-13 RX ORDER — SIMVASTATIN 20 MG/1
20 TABLET, FILM COATED ORAL
Refills: 0 | Status: ACTIVE | COMMUNITY

## 2025-02-24 ENCOUNTER — OUTPATIENT (OUTPATIENT)
Dept: OUTPATIENT SERVICES | Facility: HOSPITAL | Age: 75
LOS: 1 days | End: 2025-02-24
Payer: MEDICARE

## 2025-02-24 ENCOUNTER — RESULT REVIEW (OUTPATIENT)
Age: 75
End: 2025-02-24

## 2025-02-24 DIAGNOSIS — D05.11 INTRADUCTAL CARCINOMA IN SITU OF RIGHT BREAST: ICD-10-CM

## 2025-02-24 DIAGNOSIS — R92.2 INCONCLUSIVE MAMMOGRAM: ICD-10-CM

## 2025-02-24 DIAGNOSIS — Z12.31 ENCOUNTER FOR SCREENING MAMMOGRAM FOR MALIGNANT NEOPLASM OF BREAST: ICD-10-CM

## 2025-02-24 PROCEDURE — 77067 SCR MAMMO BI INCL CAD: CPT | Mod: 26

## 2025-02-24 PROCEDURE — 77063 BREAST TOMOSYNTHESIS BI: CPT | Mod: 26

## 2025-02-24 PROCEDURE — 77067 SCR MAMMO BI INCL CAD: CPT

## 2025-02-24 PROCEDURE — 77063 BREAST TOMOSYNTHESIS BI: CPT

## 2025-02-25 DIAGNOSIS — R92.2 INCONCLUSIVE MAMMOGRAM: ICD-10-CM

## 2025-02-25 DIAGNOSIS — Z12.31 ENCOUNTER FOR SCREENING MAMMOGRAM FOR MALIGNANT NEOPLASM OF BREAST: ICD-10-CM

## 2025-03-10 ENCOUNTER — APPOINTMENT (OUTPATIENT)
Dept: BREAST CENTER | Facility: CLINIC | Age: 75
End: 2025-03-10
Payer: MEDICARE

## 2025-03-10 VITALS
SYSTOLIC BLOOD PRESSURE: 164 MMHG | WEIGHT: 123 LBS | DIASTOLIC BLOOD PRESSURE: 82 MMHG | HEART RATE: 74 BPM | BODY MASS INDEX: 21 KG/M2 | HEIGHT: 64 IN

## 2025-03-10 DIAGNOSIS — N60.12 DIFFUSE CYSTIC MASTOPATHY OF RIGHT BREAST: ICD-10-CM

## 2025-03-10 DIAGNOSIS — D05.11 INTRADUCTAL CARCINOMA IN SITU OF RIGHT BREAST: ICD-10-CM

## 2025-03-10 DIAGNOSIS — Z98.890 OTHER SPECIFIED POSTPROCEDURAL STATES: ICD-10-CM

## 2025-03-10 DIAGNOSIS — N60.11 DIFFUSE CYSTIC MASTOPATHY OF RIGHT BREAST: ICD-10-CM

## 2025-03-10 PROCEDURE — 99214 OFFICE O/P EST MOD 30 MIN: CPT

## 2025-04-22 ENCOUNTER — NON-APPOINTMENT (OUTPATIENT)
Age: 75
End: 2025-04-22

## 2025-04-23 ENCOUNTER — APPOINTMENT (OUTPATIENT)
Dept: CARDIOLOGY | Facility: CLINIC | Age: 75
End: 2025-04-23
Payer: MEDICARE

## 2025-04-23 VITALS
DIASTOLIC BLOOD PRESSURE: 80 MMHG | WEIGHT: 122 LBS | SYSTOLIC BLOOD PRESSURE: 140 MMHG | BODY MASS INDEX: 20.83 KG/M2 | HEART RATE: 71 BPM | HEIGHT: 64 IN

## 2025-04-23 VITALS — DIASTOLIC BLOOD PRESSURE: 75 MMHG | SYSTOLIC BLOOD PRESSURE: 138 MMHG

## 2025-04-23 DIAGNOSIS — I10 ESSENTIAL (PRIMARY) HYPERTENSION: ICD-10-CM

## 2025-04-23 DIAGNOSIS — Z71.3 DIETARY COUNSELING AND SURVEILLANCE: ICD-10-CM

## 2025-04-23 DIAGNOSIS — Z71.82 EXERCISE COUNSELING: ICD-10-CM

## 2025-04-23 DIAGNOSIS — E78.5 HYPERLIPIDEMIA, UNSPECIFIED: ICD-10-CM

## 2025-04-23 DIAGNOSIS — Z71.89 OTHER SPECIFIED COUNSELING: ICD-10-CM

## 2025-04-23 PROCEDURE — 93000 ELECTROCARDIOGRAM COMPLETE: CPT

## 2025-04-23 PROCEDURE — G2211 COMPLEX E/M VISIT ADD ON: CPT

## 2025-04-23 PROCEDURE — 99214 OFFICE O/P EST MOD 30 MIN: CPT

## 2025-04-24 ENCOUNTER — APPOINTMENT (OUTPATIENT)
Dept: CARDIOLOGY | Facility: CLINIC | Age: 75
End: 2025-04-24

## 2025-05-06 ENCOUNTER — APPOINTMENT (OUTPATIENT)
Dept: CARDIOLOGY | Facility: CLINIC | Age: 75
End: 2025-05-06
Payer: MEDICARE

## 2025-05-06 DIAGNOSIS — Z71.82 EXERCISE COUNSELING: ICD-10-CM

## 2025-05-06 DIAGNOSIS — E78.5 HYPERLIPIDEMIA, UNSPECIFIED: ICD-10-CM

## 2025-05-06 DIAGNOSIS — Z71.89 OTHER SPECIFIED COUNSELING: ICD-10-CM

## 2025-05-06 DIAGNOSIS — I10 ESSENTIAL (PRIMARY) HYPERTENSION: ICD-10-CM

## 2025-05-06 PROCEDURE — 99212 OFFICE O/P EST SF 10 MIN: CPT

## 2025-06-17 ENCOUNTER — APPOINTMENT (OUTPATIENT)
Dept: CARDIOLOGY | Facility: CLINIC | Age: 75
End: 2025-06-17

## 2025-06-19 ENCOUNTER — NON-APPOINTMENT (OUTPATIENT)
Age: 75
End: 2025-06-19

## 2025-06-19 ENCOUNTER — APPOINTMENT (OUTPATIENT)
Dept: CARDIOLOGY | Facility: CLINIC | Age: 75
End: 2025-06-19
Payer: MEDICARE

## 2025-06-19 VITALS — SYSTOLIC BLOOD PRESSURE: 120 MMHG | DIASTOLIC BLOOD PRESSURE: 62 MMHG

## 2025-06-19 VITALS
DIASTOLIC BLOOD PRESSURE: 80 MMHG | BODY MASS INDEX: 20.32 KG/M2 | SYSTOLIC BLOOD PRESSURE: 132 MMHG | WEIGHT: 119 LBS | HEIGHT: 64 IN

## 2025-06-19 PROCEDURE — 99213 OFFICE O/P EST LOW 20 MIN: CPT

## 2025-09-08 ENCOUNTER — APPOINTMENT (OUTPATIENT)
Dept: ENDOCRINOLOGY | Facility: CLINIC | Age: 75
End: 2025-09-08
Payer: MEDICARE

## 2025-09-08 ENCOUNTER — APPOINTMENT (OUTPATIENT)
Dept: ENDOCRINOLOGY | Facility: CLINIC | Age: 75
End: 2025-09-08

## 2025-09-08 VITALS
HEIGHT: 64 IN | HEART RATE: 78 BPM | WEIGHT: 127 LBS | BODY MASS INDEX: 21.68 KG/M2 | DIASTOLIC BLOOD PRESSURE: 70 MMHG | OXYGEN SATURATION: 98 % | RESPIRATION RATE: 18 BRPM | SYSTOLIC BLOOD PRESSURE: 120 MMHG

## 2025-09-08 DIAGNOSIS — M85.80 OTHER SPECIFIED DISORDERS OF BONE DENSITY AND STRUCTURE, UNSPECIFIED SITE: ICD-10-CM

## 2025-09-08 DIAGNOSIS — E04.2 NONTOXIC MULTINODULAR GOITER: ICD-10-CM

## 2025-09-08 DIAGNOSIS — Z78.9 OTHER SPECIFIED HEALTH STATUS: ICD-10-CM

## 2025-09-08 DIAGNOSIS — Z85.3 PERSONAL HISTORY OF MALIGNANT NEOPLASM OF BREAST: ICD-10-CM

## 2025-09-08 DIAGNOSIS — Z86.19 PERSONAL HISTORY OF OTHER INFECTIOUS AND PARASITIC DISEASES: ICD-10-CM

## 2025-09-08 DIAGNOSIS — Z86.39 PERSONAL HISTORY OF OTHER ENDOCRINE, NUTRITIONAL AND METABOLIC DISEASE: ICD-10-CM

## 2025-09-08 DIAGNOSIS — K46.9 UNSPECIFIED ABDOMINAL HERNIA W/OUT OBSTRUCTION OR GANGRENE: ICD-10-CM

## 2025-09-08 DIAGNOSIS — Z87.19 PERSONAL HISTORY OF OTHER DISEASES OF THE DIGESTIVE SYSTEM: ICD-10-CM

## 2025-09-08 DIAGNOSIS — E78.5 HYPERLIPIDEMIA, UNSPECIFIED: ICD-10-CM

## 2025-09-08 DIAGNOSIS — Z85.819 PERSONAL HISTORY OF MALIGNANT NEOPLASM OF UNSPECIFIED SITE OF LIP, ORAL CAVITY, AND PHARYNX: ICD-10-CM

## 2025-09-08 DIAGNOSIS — I10 ESSENTIAL (PRIMARY) HYPERTENSION: ICD-10-CM

## 2025-09-08 PROCEDURE — 99214 OFFICE O/P EST MOD 30 MIN: CPT

## 2025-09-08 RX ORDER — AZELAIC ACID 0.15 G/G
15 GEL TOPICAL
Refills: 0 | Status: ACTIVE | COMMUNITY

## 2025-09-08 RX ORDER — LATANOPROST/PF 0.005 %
0.01 DROPS OPHTHALMIC (EYE)
Refills: 0 | Status: ACTIVE | COMMUNITY

## 2025-09-08 RX ORDER — FAMOTIDINE 40 MG/1
40 TABLET, FILM COATED ORAL
Refills: 0 | Status: ACTIVE | COMMUNITY

## 2025-09-08 RX ORDER — MIRABEGRON 50 MG/1
TABLET, EXTENDED RELEASE ORAL
Refills: 0 | Status: ACTIVE | COMMUNITY